# Patient Record
Sex: FEMALE | Race: WHITE | NOT HISPANIC OR LATINO | Employment: OTHER | URBAN - NONMETROPOLITAN AREA
[De-identification: names, ages, dates, MRNs, and addresses within clinical notes are randomized per-mention and may not be internally consistent; named-entity substitution may affect disease eponyms.]

---

## 2017-02-20 RX ORDER — MELOXICAM 15 MG/1
TABLET ORAL
Qty: 30 TABLET | Refills: 5 | Status: SHIPPED | OUTPATIENT
Start: 2017-02-20 | End: 2017-06-20 | Stop reason: SDUPTHER

## 2017-05-19 RX ORDER — MEMANTINE HYDROCHLORIDE 28 MG/1
CAPSULE, EXTENDED RELEASE ORAL
Qty: 30 CAPSULE | Refills: 5 | Status: SHIPPED | OUTPATIENT
Start: 2017-05-19 | End: 2017-06-20 | Stop reason: ALTCHOICE

## 2017-06-20 ENCOUNTER — OFFICE VISIT (OUTPATIENT)
Dept: NEUROLOGY | Facility: CLINIC | Age: 72
End: 2017-06-20

## 2017-06-20 VITALS
WEIGHT: 110 LBS | DIASTOLIC BLOOD PRESSURE: 80 MMHG | BODY MASS INDEX: 20.24 KG/M2 | HEIGHT: 62 IN | SYSTOLIC BLOOD PRESSURE: 128 MMHG | HEART RATE: 64 BPM

## 2017-06-20 DIAGNOSIS — R56.9 GENERALIZED CONVULSIVE SEIZURES (HCC): ICD-10-CM

## 2017-06-20 DIAGNOSIS — F01.50 VASCULAR DEMENTIA WITHOUT BEHAVIORAL DISTURBANCE (HCC): Primary | ICD-10-CM

## 2017-06-20 PROCEDURE — 99213 OFFICE O/P EST LOW 20 MIN: CPT | Performed by: PHYSICIAN ASSISTANT

## 2017-06-20 RX ORDER — MELOXICAM 15 MG/1
15 TABLET ORAL DAILY
Qty: 90 TABLET | Refills: 3 | Status: SHIPPED | OUTPATIENT
Start: 2017-06-20 | End: 2018-06-23 | Stop reason: SDUPTHER

## 2017-06-22 RX ORDER — MEMANTINE HYDROCHLORIDE 28 MG/1
28 CAPSULE, EXTENDED RELEASE ORAL DAILY
Qty: 45 CAPSULE | Refills: 5 | Status: SHIPPED | OUTPATIENT
Start: 2017-06-22 | End: 2018-03-20 | Stop reason: SDUPTHER

## 2017-06-22 NOTE — TELEPHONE ENCOUNTER
Since starting the Namzaric Bita has been nauseated. She would like to stop the Namzaric and go back to the Namenda XR.

## 2017-06-25 NOTE — PROGRESS NOTES
Subjective   Bita Blake is a 72 y.o. female is here today for follow-up.    HPI Comments: No seizure activity.  Household and activities of daily living are well tolerated at this point.    Dementia   This is a chronic problem. The current episode started more than 1 year ago. The problem occurs daily. The problem has been unchanged. Associated symptoms include fatigue and headaches. Pertinent negatives include no chills, congestion, fever, sore throat or weakness. The symptoms are aggravated by stress. Treatments tried: Medical therapy. The treatment provided mild relief.   Seizures    This is a recurrent problem. Episode onset: Proximal in 1 year ago. The problem has been resolved. There were 2 to 3 seizures. The most recent episode lasted 30 to 120 seconds. Associated symptoms include sleepiness, confusion and headaches. Pertinent negatives include no speech difficulty, no visual disturbance and no sore throat. Characteristics include eye blinking, eye deviation and loss of consciousness. The episode was witnessed. There was no sensation of an aura present.       The following portions of the patient's history were reviewed and updated as appropriate: allergies, current medications, past family history, past medical history, past social history, past surgical history and problem list.    Review of Systems   Constitutional: Positive for fatigue. Negative for chills and fever.   HENT: Negative for congestion, ear pain and sore throat.    Eyes: Negative.  Negative for visual disturbance.   Respiratory: Negative.    Cardiovascular: Negative.    Gastrointestinal: Negative.    Endocrine: Negative.    Genitourinary: Negative.    Musculoskeletal: Negative.    Skin: Negative.    Allergic/Immunologic: Negative.    Neurological: Positive for loss of consciousness and headaches. Negative for seizures, facial asymmetry, speech difficulty and weakness.   Hematological: Negative.    Psychiatric/Behavioral: Positive for  agitation, confusion, decreased concentration and dysphoric mood. Negative for behavioral problems, hallucinations and sleep disturbance. The patient is nervous/anxious.        Objective   Physical Exam   Constitutional: Vital signs are normal. She appears well-developed and well-nourished. No distress.   HENT:   Head: Normocephalic and atraumatic.   Right Ear: Hearing, tympanic membrane, external ear and ear canal normal.   Left Ear: Hearing, tympanic membrane, external ear and ear canal normal.   Nose: Nose normal.   Mouth/Throat: Uvula is midline, oropharynx is clear and moist and mucous membranes are normal.   Eyes: Conjunctivae, EOM and lids are normal. Pupils are equal, round, and reactive to light. No scleral icterus.   Neck: Normal range of motion and phonation normal. No tracheal tenderness, no spinous process tenderness and no muscular tenderness present. Carotid bruit is not present. Normal range of motion present. No thyroid mass and no thyromegaly present.   Cardiovascular: Normal rate, regular rhythm, S1 normal, S2 normal and normal heart sounds.    No murmur heard.  Pulmonary/Chest: Effort normal and breath sounds normal. No stridor. No respiratory distress. She has no decreased breath sounds. She has no wheezes. She has no rhonchi. She has no rales.   Musculoskeletal: Normal range of motion. She exhibits no edema or tenderness.        Lumbar back: Normal.   Lymphadenopathy:     She has no cervical adenopathy.   Neurological: She is alert. She has normal strength and normal reflexes. She displays no atrophy and no tremor. No cranial nerve deficit or sensory deficit. She exhibits normal muscle tone. She displays a negative Romberg sign. Coordination and gait normal. GCS eye subscore is 4. GCS verbal subscore is 4. GCS motor subscore is 6.   Reflex Scores:       Tricep reflexes are 2+ on the right side and 2+ on the left side.       Bicep reflexes are 2+ on the right side and 2+ on the left side.        Brachioradialis reflexes are 2+ on the right side and 2+ on the left side.       Patellar reflexes are 2+ on the right side and 2+ on the left side.       Achilles reflexes are 2+ on the right side and 2+ on the left side.  Skin: Skin is warm, dry and intact. No ecchymosis, no lesion and no rash noted. No cyanosis. Nails show no clubbing.   Psychiatric: Her mood appears anxious. Her speech is delayed and tangential. She is slowed. She is not actively hallucinating. Thought content is paranoid and delusional. Cognition and memory are impaired. She expresses impulsivity. She exhibits a depressed mood. She is inattentive.   Nursing note and vitals reviewed.        Assessment/Plan   Bita was seen today for dementia.    Diagnoses and all orders for this visit:    Vascular dementia without behavioral disturbance  -     Discontinue: Memantine HCl-Donepezil HCl (NAMZARIC) 28-10 MG capsule sustained-release 24 hr; Take 1 capsule by mouth Daily.    Generalized convulsive seizures    Other orders  -     meloxicam (MOBIC) 15 MG tablet; Take 1 tablet by mouth Daily.    She will continue on namzaric 28-10 milligrams daily.  We have refilled her mobility today.  Overall recommended no changes in her medication regimen as she is stable at this point.    10 minutes of a 15 minute outpatient visit was spent in counseling and coordination of care with the patient and her family.        EMR Dragon transcription disclaimer:  Much of this encounter note is an electronic transcription/translation of spoken language to printed text.  The electronic translation of spoken language may permit erroneous, or at times, nonsensical words or phrases to be inadvertently transcribed.  The author has reviewed the note for such errors, however some may still exist.

## 2017-09-21 ENCOUNTER — OFFICE VISIT (OUTPATIENT)
Dept: NEUROLOGY | Facility: CLINIC | Age: 72
End: 2017-09-21

## 2017-09-21 VITALS
DIASTOLIC BLOOD PRESSURE: 80 MMHG | HEART RATE: 70 BPM | SYSTOLIC BLOOD PRESSURE: 142 MMHG | BODY MASS INDEX: 20.24 KG/M2 | WEIGHT: 110 LBS | OXYGEN SATURATION: 99 % | HEIGHT: 62 IN

## 2017-09-21 DIAGNOSIS — F01.50 VASCULAR DEMENTIA WITHOUT BEHAVIORAL DISTURBANCE (HCC): Primary | ICD-10-CM

## 2017-09-21 DIAGNOSIS — R56.9 GENERALIZED CONVULSIVE SEIZURES (HCC): ICD-10-CM

## 2017-09-21 PROCEDURE — 99213 OFFICE O/P EST LOW 20 MIN: CPT | Performed by: PHYSICIAN ASSISTANT

## 2017-09-21 RX ORDER — DONEPEZIL HYDROCHLORIDE 5 MG/1
5 TABLET, FILM COATED ORAL NIGHTLY
Qty: 30 TABLET | Refills: 3 | Status: SHIPPED | OUTPATIENT
Start: 2017-09-21 | End: 2018-02-12 | Stop reason: SDUPTHER

## 2017-09-21 NOTE — PROGRESS NOTES
Subjective   Bita Blake is a 72 y.o. female is here today for follow-up.    HPI Comments: No seizure activity.  Household and activities of daily living are well tolerated at this point.    Seizures    This is a recurrent problem. Episode onset: Proximal in 1 year ago. The problem has been resolved. There were 2 to 3 seizures. The most recent episode lasted 30 to 120 seconds. Associated symptoms include sleepiness, confusion and headaches. Pertinent negatives include no speech difficulty and no sore throat. Characteristics include eye blinking, eye deviation and loss of consciousness. The episode was witnessed. There was no sensation of an aura present.   Dementia   This is a chronic problem. The current episode started more than 1 year ago. The problem occurs daily. The problem has been unchanged. Associated symptoms include fatigue and headaches. Pertinent negatives include no chills, congestion, fever, sore throat or weakness. The symptoms are aggravated by stress. Treatments tried: Medical therapy. The treatment provided mild relief.       The following portions of the patient's history were reviewed and updated as appropriate: allergies, current medications, past family history, past medical history, past social history, past surgical history and problem list.    Review of Systems   Constitutional: Positive for fatigue. Negative for chills and fever.   HENT: Negative for congestion, ear pain and sore throat.    Eyes: Negative.    Respiratory: Negative.    Cardiovascular: Negative.    Gastrointestinal: Negative.    Endocrine: Negative.    Genitourinary: Negative.    Musculoskeletal: Negative.    Skin: Negative.    Allergic/Immunologic: Negative.    Neurological: Positive for loss of consciousness and headaches. Negative for seizures, facial asymmetry, speech difficulty and weakness.   Hematological: Negative.    Psychiatric/Behavioral: Positive for agitation, confusion, decreased concentration and dysphoric  mood. Negative for behavioral problems, hallucinations and sleep disturbance. The patient is nervous/anxious.        Objective   Physical Exam   Constitutional: Vital signs are normal. She appears well-developed and well-nourished. No distress.   HENT:   Head: Normocephalic and atraumatic.   Right Ear: Hearing, tympanic membrane, external ear and ear canal normal.   Left Ear: Hearing, tympanic membrane, external ear and ear canal normal.   Nose: Nose normal.   Mouth/Throat: Uvula is midline, oropharynx is clear and moist and mucous membranes are normal.   Eyes: Conjunctivae, EOM and lids are normal. Pupils are equal, round, and reactive to light. No scleral icterus.   Neck: Normal range of motion and phonation normal. No tracheal tenderness, no spinous process tenderness and no muscular tenderness present. Carotid bruit is not present. Normal range of motion present. No thyroid mass and no thyromegaly present.   Cardiovascular: Normal rate, regular rhythm, S1 normal, S2 normal and normal heart sounds.    No murmur heard.  Pulmonary/Chest: Effort normal and breath sounds normal. No stridor. No respiratory distress. She has no decreased breath sounds. She has no wheezes. She has no rhonchi. She has no rales.   Musculoskeletal: Normal range of motion. She exhibits no edema or tenderness.        Lumbar back: Normal.   Lymphadenopathy:     She has no cervical adenopathy.   Neurological: She is alert. She has normal strength and normal reflexes. She displays no atrophy and no tremor. No cranial nerve deficit or sensory deficit. She exhibits normal muscle tone. She displays a negative Romberg sign. Coordination and gait normal. GCS eye subscore is 4. GCS verbal subscore is 4. GCS motor subscore is 6.   Reflex Scores:       Tricep reflexes are 2+ on the right side and 2+ on the left side.       Bicep reflexes are 2+ on the right side and 2+ on the left side.       Brachioradialis reflexes are 2+ on the right side and 2+ on  the left side.       Patellar reflexes are 2+ on the right side and 2+ on the left side.       Achilles reflexes are 2+ on the right side and 2+ on the left side.  Skin: Skin is warm, dry and intact. No ecchymosis, no lesion and no rash noted. No cyanosis. Nails show no clubbing.   Psychiatric: Her mood appears anxious. Her speech is delayed and tangential. She is slowed. She is not actively hallucinating. Thought content is paranoid and delusional. Cognition and memory are impaired. She expresses impulsivity. She exhibits a depressed mood. She is inattentive.   Nursing note and vitals reviewed.        Assessment/Plan   Bita was seen today for seizures and dementia.    Diagnoses and all orders for this visit:    Vascular dementia without behavioral disturbance  -     donepezil (ARICEPT) 5 MG tablet; Take 1 tablet by mouth Every Night.    Generalized convulsive seizures    The patient was unable to tolerate donepezil 10 mg due to GI side effects.  The family is interested in agreeable to trying Aricept 5 mg daily.  No other changes were made to her medication regimen today.    The patient and family continue to wish to avoid antiepileptic therapy she has had no further seizures.    10 minutes of 15 minute outpatient visit was spent in counseling and coordination of care today.        EMR Dragon transcription disclaimer:  Much of this encounter note is an electronic transcription/translation of spoken language to printed text.  The electronic translation of spoken language may permit erroneous, or at times, nonsensical words or phrases to be inadvertently transcribed.  The author has reviewed the note for such errors, however some may still exist.

## 2017-12-24 ENCOUNTER — HOSPITAL ENCOUNTER (EMERGENCY)
Facility: HOSPITAL | Age: 72
Discharge: HOME | End: 2017-12-24
Attending: EMERGENCY MEDICINE | Admitting: EMERGENCY MEDICINE

## 2017-12-24 VITALS
DIASTOLIC BLOOD PRESSURE: 77 MMHG | HEART RATE: 96 BPM | BODY MASS INDEX: 20.24 KG/M2 | WEIGHT: 110 LBS | RESPIRATION RATE: 18 BRPM | HEIGHT: 62 IN | OXYGEN SATURATION: 99 % | TEMPERATURE: 98.6 F | SYSTOLIC BLOOD PRESSURE: 147 MMHG

## 2017-12-24 DIAGNOSIS — R05.9 COUGH: Primary | ICD-10-CM

## 2017-12-24 LAB
FLUAV AG NPH QL: NEGATIVE
FLUBV AG NPH QL IA: NEGATIVE

## 2017-12-24 PROCEDURE — 87804 INFLUENZA ASSAY W/OPTIC: CPT | Performed by: EMERGENCY MEDICINE

## 2017-12-24 PROCEDURE — 99283 EMERGENCY DEPT VISIT LOW MDM: CPT

## 2017-12-24 RX ORDER — FEXOFENADINE HCL AND PSEUDOEPHEDRINE HCI 180; 240 MG/1; MG/1
1 TABLET, EXTENDED RELEASE ORAL DAILY
Qty: 30 TABLET | Refills: 0 | Status: SHIPPED | OUTPATIENT
Start: 2017-12-24 | End: 2019-03-18

## 2017-12-24 RX ORDER — FLUTICASONE PROPIONATE 50 MCG
2 SPRAY, SUSPENSION (ML) NASAL DAILY
Qty: 1 BOTTLE | Refills: 0 | Status: SHIPPED | OUTPATIENT
Start: 2017-12-24 | End: 2018-03-20

## 2018-01-03 NOTE — ED NOTES
"ED Call Back Questions    1. How are you doing since leaving the Emergency Department?    Doing a little better  2. Do you have any questions about your discharge instructions? No     3. Have you filled your new prescriptions yet? Yes   a. Do you have any questions about those medications? No     4. Were you able to make a follow-up appointment with the physician? No     5. Do you have a primary care physician? Yes   a. If No, would you like for me to set you up with one? No   i. If Yes, “I will have our ED  give you a call right back at this number to work with you on the best time for an appointment.”    6. We are always looking to get better at what we do. Do you have any suggestions for what we can do to be even better? N/A  a. If Yes, \"Thank you for sharing your concerns. I apologize. I will follow up with our manager and patient . Would you like someone to call you back?\" No     7. Is there anything else I can do for you? No   Visit was good     Jeevan Buckley  01/03/18 [id number]    "

## 2018-02-12 DIAGNOSIS — F01.50 VASCULAR DEMENTIA WITHOUT BEHAVIORAL DISTURBANCE (HCC): ICD-10-CM

## 2018-02-12 RX ORDER — DONEPEZIL HYDROCHLORIDE 5 MG/1
TABLET, FILM COATED ORAL
Qty: 30 TABLET | Refills: 2 | Status: SHIPPED | OUTPATIENT
Start: 2018-02-12 | End: 2018-03-20 | Stop reason: SDUPTHER

## 2018-03-13 ENCOUNTER — TRANSCRIBE ORDERS (OUTPATIENT)
Dept: ADMINISTRATIVE | Facility: HOSPITAL | Age: 73
End: 2018-03-13

## 2018-03-13 DIAGNOSIS — R13.11 ORAL PHASE DYSPHAGIA: Primary | ICD-10-CM

## 2018-03-15 ENCOUNTER — HOSPITAL ENCOUNTER (OUTPATIENT)
Dept: GENERAL RADIOLOGY | Facility: HOSPITAL | Age: 73
Discharge: HOME | End: 2018-03-15
Admitting: NURSE PRACTITIONER

## 2018-03-15 DIAGNOSIS — R13.12 OROPHARYNGEAL DYSPHAGIA: Primary | ICD-10-CM

## 2018-03-15 DIAGNOSIS — R13.11 ORAL PHASE DYSPHAGIA: ICD-10-CM

## 2018-03-15 PROCEDURE — 74230 X-RAY XM SWLNG FUNCJ C+: CPT

## 2018-03-15 PROCEDURE — 63710000001 BARIUM SULFATE 40 % PASTE: Performed by: NURSE PRACTITIONER

## 2018-03-15 PROCEDURE — 63710000001 BARIUM SULFATE 40 % SUSPENSION: Performed by: NURSE PRACTITIONER

## 2018-03-15 PROCEDURE — A9270 NON-COVERED ITEM OR SERVICE: HCPCS | Performed by: NURSE PRACTITIONER

## 2018-03-15 PROCEDURE — 63710000001 BARIUM SULFATE 40 % RECONSTITUTED SUSPENSION: Performed by: NURSE PRACTITIONER

## 2018-03-15 PROCEDURE — 92611 MOTION FLUOROSCOPY/SWALLOW: CPT

## 2018-03-15 PROCEDURE — G8998 SWALLOW D/C STATUS: HCPCS

## 2018-03-15 PROCEDURE — G8997 SWALLOW GOAL STATUS: HCPCS

## 2018-03-15 PROCEDURE — G8996 SWALLOW CURRENT STATUS: HCPCS

## 2018-03-15 RX ADMIN — BARIUM SULFATE 20 ML: 400 SUSPENSION ORAL at 09:15

## 2018-03-15 RX ADMIN — BARIUM SULFATE 20 ML: 400 PASTE ORAL at 09:15

## 2018-03-15 RX ADMIN — BARIUM SULFATE 20 ML: 0.81 POWDER, FOR SUSPENSION ORAL at 09:15

## 2018-03-15 NOTE — MBS/VFSS/FEES
Speech Language Pathology   [facility] [place]  [facility]       Patient Name: Bita Blake  : 1945  MRN: [id number]    Today's Date: 3/15/2018      Visit Date: 03/15/2018     SPEECH-LANGUAGE PATHOLOGY EVALUTION - VFSS  Subjective: The patient was seen on this date for a VFSS(Videofluoroscopic Swallowing Study).  Patient was alert and cooperative.    Significant history: Dementia, dysphagia     Objective: Risks/benefits were reviewed with the patient, and consent was obtained. The study was completed with SLP and Radiologist present. The patient was seen in lateral view(s). Textures given included thin liquid, nectar thick liquid, honey thick liquid, puree consistency, mechanical soft consistency and regular consistency.    Assessment: The pt was presented the above consistencies in the following order: honey thick, nectar thick (straw), thin (straw), pudding thick, mechanical soft, solid, repeat thin trial, and a barium tablet (utilizing thin liquid water via cup). Pt was noted to have mildly decreased bolus formation, as well as occasional difficulty with lingual anterior to posterior movement of the bolus, resulting in lingual pumping. Decreased base of tongue strength observed on multiple instances, resulting in premature loss as significant as spillage to the pyriform sinuses with nectar thick and thin. Delay in swallow initiation also noted with all presented trials. Moderately decreased laryngeal elevation with weak anterior hyolaryngeal excursion of the hyoid. Initially no epiglottic inversion, though inversion did improve to functional with mechanical soft and regular solid, likely secondary to the increased density of the bolus. Pt was noted to have laryngeal penetration following the initial thin liquid trial of residue undercoating the epiglottis, which was silent and did fall to the level of the vocal folds, without definite aspiration, which remained in the vestibule for the  remainder of the study. Trace laryngeal penetration noted on second thin trial of study. No definite of aspiration was observed throughout the eval. Post swallow residue was felt to be mild-moderate throughout the oropharyngeal area inconsistently, which the pt often clear to mild with a spontaneous multiple swallow.     SLP Findings: Patient presents with mild oropharyngeal dysphagia.     Comments: The results of this study were discussed in full with the pt and pt's spouse. At this time, it is uncertain as to the level of information retention in the patient secondary to observed cognitive impairment, appearing to have delayed processing time. Both parties were educated on her increased risk for aspiration with thin liquid at this time, though given the fact that the pt has no known hx of pneumonia and her complaints at this time were not of significant concern with toleration of thin liquids, that the pt can continue with thin liquids at this time knowing the risk, though the option for thickened liquids was presented at this time. Pt's  also feels best option is to continue with thin liquids at this time, to continue to allow for optimal quality of life through PO intake, given that the pt has had no significant respiratory concerns in association with liquid intake. They were also educated on the Radiologists' concern with the esophageal region, with recommendation for GI consult at this time.      Recommendations: Diet Textures: thin liquid, mechanical soft consistency food. Medications should be taken whole with puree.     Recommended Strategies: Upright for 30 minutes following PO intake, Upright for PO and small bites and sips. Oral care before breakfast, after all meals and PRN.    Other Recommended Evaluations: Referral to GI for esophageal concerns. See Radiologists' report for details.    Dysphagia therapy is not recommended at this time, as pt's swallow is felt to be functional, though should be  "monitored closely by pt's spouse to r/o change or new concerns. Rationale: See above. MD to order repeat VFSS if change or new concerns. Thanks!   Bernadette Balderas, CCC-SLP 3/15/2018 11:35 AM    Visit Dx:     ICD-10-CM ICD-9-CM   1. Oropharyngeal dysphagia R13.12 787.22   2. Oral phase dysphagia R13.11 787.21       Patient Active Problem List   Diagnosis   • Vascular dementia without behavioral disturbance   • Generalized convulsive seizures        Past Medical History:   Diagnosis Date   • Dementia    • Seizures         Past Surgical History:   Procedure Laterality Date   • CHOLECYSTECTOMY     • HYSTERECTOMY                         SLP Adult Swallow Evaluation - 03/15/18 0907        Rehab Evaluation    Document Type evaluation  -TM    Subjective Information no complaints  -TM    Patient Observations alert;cooperative  -TM    Patient/Family Observations Pt was noted to have decreased cognitive fx,  reported hx of dementia for four years.  -TM    Patient Effort good  -TM       General Information    Patient Profile Reviewed yes  -TM    Pertinent History Of Current Problem Pt c/o difficulty swallowing pills and solids, stating that she continually feels as if stated swallowed consistencies are \"sticking\" and will not go down, which persists even hours after PO intake. Reported no prior hx of pneumonia, no other significant past medical hx identified.   -TM    Current Method of Nutrition regular textures;thin liquids  -TM    Precautions/Limitations, Vision WFL with corrective lenses  -TM    Precautions/Limitations, Hearing WFL;other (see comments)   Hearing deficit, or solely secondary to delayed cognition   -TM    Prior Level of Function-Communication cognitive-linguistic impairment  -TM    Prior Level of Function-Swallowing no diet consistency restrictions  -TM    Plans/Goals Discussed with patient;spouse/S.O.;agreed upon  -TM    Barriers to Rehab cognitive status   in patient   -TM    Patient's Goals for " Discharge --   Eating and drinking without difficulty   -TM    Family Goals for Discharge other (see comments)   Safety with PO intake   -TM       Oral Motor and Function    Dentition Assessment natural, present and adequate  -TM    Secretion Management WNL/WFL  -TM    Mucosal Quality moist, healthy  -TM    Gag Response WFL  -TM    Volitional Swallow WFL  -TM    Volitional Cough WFL  -TM       Oral Musculature and Cranial Nerve Assessment    Oral Motor General Assessment generalized oral motor weakness  -TM    Oral Labial or Buccal Impairment, Detail, Cranial Nerve VII (Facial): CN7: Motor Impairment;reduced strength bilaterally  -TM    Lingual Impairment, Detail. Cranial Nerves IX, XII (Glossopharyngeal and Hypoglossal) CN12: Motor Impairment;reduced strength  -TM    Oral Motor, Comment Pt was noted to have some difficulty following commands for oral motor tasks, as she completed labial alternations initially when asked to move tongue from side to side.   -TM       General Eating/Swallowing Observations    Respiratory Support Currently in Use room air  -TM    Eating/Swallowing Skills other (see comments)   Fed per Radiology tech  -TM    Positioning During Eating upright 90 degree;other (see comments)   While standing   -TM       Respiratory    Respiratory Status WFL;room air  -TM       MBS/VFSS    Utensils Used spoon;straw  -TM    Consistencies Trialed regular textures;soft textures;pureed;thin liquids;nectar/syrup-thick liquids;honey-thick liquids  -TM       MBS/VFSS Interpretation    Oral Prep Phase impaired oral phase of swallowing  -TM    Oral Transit Phase impaired  -TM    Oral Residue WFL  -TM    Oral Phase, Comment Decreased bolus formation, difficulty with lingual A-P movement with lingual pumping on occasion, decreased base of tongue strength resulting in premature loss into the pharynx.  -TM       Oral Preparatory Phase    Oral Preparatory Phase prolonged manipulation;inadequate manipulation  -TM     Prolonged Manipulation regular textures  -TM    Inadequate Manipulation mechanical soft  -TM       Oral Transit Phase    Impaired Oral Transit Phase increased A-P transit time;tongue pumping;piecemeal oral transit;premature spillage of liquids into pharynx  -TM    Increased A-P Transit Time mechanical soft  -TM    Tongue Pumping mechanical soft  -TM    Piecemeal Oral Transit pudding/puree;mechanical soft  -TM    Premature Spillage of Liquids into Pharynx nectar-thick liquids;thin liquids;pudding/puree  -TM       Initiation of Pharyngeal Swallow    Initiation of Pharyngeal Swallow bolus in valleculae;bolus in pyriform sinuses  -TM    Pharyngeal Phase impaired pharyngeal phase of swallowing  -TM    Penetration After the Swallow thin liquids;secondary to residue;other (see comments)   Undercoating the epiglottis   -TM    Response to Penetration deep;no response  -TM    Pharyngeal Residue base of tongue;valleculae;pyriform sinuses;laryngeal vestibule  -TM    Response to Residue cleared residue with spontaneous subsequent swallow;other (see comments)   Able to clear from mod to mild   -TM       Esophageal Phase    Esophageal Phase esophageal retention;see radiology report for further details  -TM    Esophageal Phase, Comment Radiologist verbalized recommendation for GI consult following assessement, scope vs CT to be determined by GI  -TM       Clinical Impression    SLP Swallowing Diagnosis mild-moderate  -TM    Functional Impact risk of aspiration/pneumonia  -TM    Criteria for Skilled Therapeutic Interventions Met baseline status  -TM       Recommendations    Therapy Frequency (SLP) evaluation only  -TM    SLP Diet Recommendation soft textures;thin liquids  -TM    Recommended Precautions and Strategies upright posture during/after eating;small bites of food and sips of liquid  -TM    SLP Rec. for Method of Medication Administration meds whole;with pudding or applesauce  -TM    Monitor for Signs of Aspiration  yes;cough;gurgly voice;throat clearing;pneumonia  -TM    Anticipated Dischage Disposition home  -TM    Demonstrates Need for Referral to Another Service gastroenterology  -TM      User Key  (r) = Recorded By, (t) = Taken By, (c) = Cosigned By    Initials Name Provider Type    TM Bernadette Balderas CCC-SLP Speech and Language Pathologist                               OP SLP Education     Row Name 03/15/18 1104       Education    Barriers to Learning Decreased comprehension  -TM    Action Taken to Address Barriers Results also reviewed with pt's spouse   -TM    Education Provided Described results of evaluation;Family/caregivers expressed understanding of evaluation;Patient expressed understanding of evaluation   Reinforcements needed for pt  -TM    Assessed Learning needs;Learning motivation;Learning preferences;Learning readiness  -TM    Learning Motivation Moderate  -TM    Learning Method Explanation  -TM    Teaching Response Reinforcement needed  -TM    Education Comments The results of this study were discussed in full with the pt and pt's spouse. At this time, it is uncertain as to the level of information retention in the patient secondary to observed cognitive impairment, appearing to have delayed processing time. Both parties were educated on her increased risk for aspiration with thin liquid at this time, though given the fact that the pt has no known hx of pneumonia and her complaints at this time were not of significant concern with toleration of thin liquids, that the pt can continue with thin liquids at this time knowing the risk, though the option for thickened liquids was presented at this time. Pt's  also feels best option is to continue with thin liquids at this time, to continue to allow for optimal quality of life through PO intake, given that the pt has had no significant respiratory concerns in association with liquid intake. They were also educated on the Radiologists' concern with the  esophageal region, with recommendation for GI consult at this time.   -TM      User Key  (r) = Recorded By, (t) = Taken By, (c) = Cosigned By    Initials Name Effective Dates    TM AVANI Bernal 08/02/16 -                       SLP Outcome Measures (last 72 hours)      SLP Outcome Measures     Row Name 03/15/18 1114             SLP Outcome Measures    Outcome Measure Used? Adult NOMS  -TM         FCM Scores    FCM Chosen Swallowing  -TM      Swallowing FCM Score 5  -TM        User Key  (r) = Recorded By, (t) = Taken By, (c) = Cosigned By    Initials Name Effective Dates     AVANI Bernal 08/02/16 -                          Time Calculation:        Therapy Charges for Today     Code Description Service Date Service Provider Modifiers Qty    44464779465 HC ST SWALLOWING CURRENT STATUS 3/15/2018 AVANI Bernal GN, CJ 1    02412968998 HC ST SWALLOWING PROJECTED 3/15/2018 AVANI Bernal GN, CJ 1    [id number] HC ST SWALLOWING DISCHARGE 3/15/2018 AVANI Bernal GN,  1    42032227733 HC ST MOTION FLUORO EVAL SWALLOW 8 3/15/2018 AVANI Bernal GN 1                  AVANI Lawson  3/15/2018

## 2018-03-20 ENCOUNTER — TRANSCRIBE ORDERS (OUTPATIENT)
Dept: SPEECH THERAPY | Facility: HOSPITAL | Age: 73
End: 2018-03-20

## 2018-03-20 ENCOUNTER — OFFICE VISIT (OUTPATIENT)
Dept: NEUROLOGY | Facility: CLINIC | Age: 73
End: 2018-03-20

## 2018-03-20 VITALS
WEIGHT: 104 LBS | OXYGEN SATURATION: 99 % | HEART RATE: 75 BPM | SYSTOLIC BLOOD PRESSURE: 128 MMHG | DIASTOLIC BLOOD PRESSURE: 68 MMHG | HEIGHT: 62 IN | BODY MASS INDEX: 19.14 KG/M2

## 2018-03-20 DIAGNOSIS — R56.9 GENERALIZED CONVULSIVE SEIZURES (HCC): ICD-10-CM

## 2018-03-20 DIAGNOSIS — F01.50 VASCULAR DEMENTIA WITHOUT BEHAVIORAL DISTURBANCE (HCC): Primary | ICD-10-CM

## 2018-03-20 DIAGNOSIS — R13.10 DYSPHAGIA, UNSPECIFIED TYPE: Primary | ICD-10-CM

## 2018-03-20 PROCEDURE — 99213 OFFICE O/P EST LOW 20 MIN: CPT | Performed by: PHYSICIAN ASSISTANT

## 2018-03-20 RX ORDER — MEMANTINE HYDROCHLORIDE 28 MG/1
28 CAPSULE, EXTENDED RELEASE ORAL DAILY
Qty: 90 CAPSULE | Refills: 3 | Status: SHIPPED | OUTPATIENT
Start: 2018-03-20 | End: 2019-03-20 | Stop reason: SDUPTHER

## 2018-03-20 RX ORDER — DONEPEZIL HYDROCHLORIDE 5 MG/1
5 TABLET, FILM COATED ORAL
Qty: 90 TABLET | Refills: 3 | Status: SHIPPED | OUTPATIENT
Start: 2018-03-20 | End: 2019-06-19 | Stop reason: SDUPTHER

## 2018-03-20 NOTE — PROGRESS NOTES
Subjective   Bita Blake is a 73 y.o. female is here today for follow-up.    No seizure activity.  Household and activities of daily living are well tolerated at this point.      Seizures    This is a recurrent problem. Episode onset: Proximal in 1 year ago. The problem has been resolved. There were 2 to 3 seizures. The most recent episode lasted 30 to 120 seconds. Associated symptoms include sleepiness, confusion and headaches. Pertinent negatives include no speech difficulty and no sore throat. Characteristics include eye blinking, eye deviation and loss of consciousness. The episode was witnessed. There was no sensation of an aura present.   Dementia   This is a chronic problem. The current episode started more than 1 year ago. The problem occurs daily. The problem has been unchanged. Associated symptoms include headaches. Pertinent negatives include no chills, congestion, fatigue, fever, sore throat or weakness. The symptoms are aggravated by stress. Treatments tried: Medical therapy. The treatment provided mild relief.       The following portions of the patient's history were reviewed and updated as appropriate: allergies, current medications, past family history, past medical history, past social history, past surgical history and problem list.    Review of Systems   Constitutional: Positive for unexpected weight change. Negative for chills, fatigue and fever.   HENT: Negative for congestion, nosebleeds, sore throat and trouble swallowing.    Eyes: Negative.    Respiratory: Negative.    Cardiovascular: Negative.    Gastrointestinal: Negative.    Endocrine: Negative.    Genitourinary: Negative.    Musculoskeletal: Negative.    Skin: Negative.    Allergic/Immunologic: Negative.    Neurological: Positive for loss of consciousness and headaches. Negative for seizures, facial asymmetry, speech difficulty and weakness.   Hematological: Negative.    Psychiatric/Behavioral: Positive for agitation, confusion,  decreased concentration and dysphoric mood. Negative for behavioral problems, hallucinations and sleep disturbance. The patient is nervous/anxious.        Objective   Physical Exam   Constitutional: Vital signs are normal. She appears well-developed and well-nourished. No distress.   HENT:   Head: Normocephalic and atraumatic.   Right Ear: Hearing, tympanic membrane, external ear and ear canal normal.   Left Ear: Hearing, tympanic membrane, external ear and ear canal normal.   Nose: Nose normal.   Mouth/Throat: Uvula is midline, oropharynx is clear and moist and mucous membranes are normal.   Eyes: Conjunctivae, EOM and lids are normal. Pupils are equal, round, and reactive to light. No scleral icterus.   Neck: Normal range of motion and phonation normal. No tracheal tenderness, no spinous process tenderness and no muscular tenderness present. Carotid bruit is not present. Normal range of motion present. No thyroid mass and no thyromegaly present.   Cardiovascular: Normal rate, regular rhythm, S1 normal, S2 normal and normal heart sounds.    No murmur heard.  Pulmonary/Chest: Effort normal and breath sounds normal. No stridor. No respiratory distress. She has no decreased breath sounds. She has no wheezes. She has no rhonchi. She has no rales.   Musculoskeletal: Normal range of motion. She exhibits no edema or tenderness.        Lumbar back: Normal.   Lymphadenopathy:     She has no cervical adenopathy.   Neurological: She is alert. She has normal strength and normal reflexes. She displays no atrophy and no tremor. No cranial nerve deficit or sensory deficit. She exhibits normal muscle tone. She displays a negative Romberg sign. Coordination and gait normal. GCS eye subscore is 4. GCS verbal subscore is 4. GCS motor subscore is 6.   Reflex Scores:       Tricep reflexes are 2+ on the right side and 2+ on the left side.       Bicep reflexes are 2+ on the right side and 2+ on the left side.       Brachioradialis  reflexes are 2+ on the right side and 2+ on the left side.       Patellar reflexes are 2+ on the right side and 2+ on the left side.       Achilles reflexes are 2+ on the right side and 2+ on the left side.  Skin: Skin is warm, dry and intact. No ecchymosis, no lesion and no rash noted. No cyanosis. Nails show no clubbing.   Psychiatric: Her mood appears anxious. Her speech is delayed and tangential. She is slowed. She is not actively hallucinating. Thought content is paranoid and delusional. Cognition and memory are impaired. She expresses impulsivity. She exhibits a depressed mood. She is inattentive.   Nursing note and vitals reviewed.      MMSE score is 15      Assessment/Plan   Bita was seen today for memory loss and seizures.    Diagnoses and all orders for this visit:    Vascular dementia without behavioral disturbance  -     memantine (NAMENDA XR) 28 MG capsule sustained-release 24 hr extended release capsule; Take 1 capsule by mouth Daily.  -     donepezil (ARICEPT) 5 MG tablet; Take 1 tablet by mouth every night at bedtime.    Generalized convulsive seizures    The patient is neurologically stable.  No changes remain her medication regimen today.    10 minutes of 15 minute outpatient visit was spent in counseling and coordination of care with the patient and her .      EMR Dragon transcription disclaimer:  Much of this encounter note is an electronic transcription/translation of spoken language to printed text.  The electronic translation of spoken language may permit erroneous, or at times, nonsensical words or phrases to be inadvertently transcribed.  The author has reviewed the note for such errors, however some may still exist.

## 2018-04-15 NOTE — PROGRESS NOTES
Chief Complaint   Patient presents with   • Difficulty Swallowing     having problems swallowing also time for colon anahi did last one been a long time she stated       Subjective     HPI    Hx of dementia.   Difficulty swallowing daily for several months.  Time would relieve sx as would repeated swallowing.   No heartburn or indigestion.  No abdominal pain.  Sx have improved since video eval in March.  Pt and  deny dietary changes to result in improvement.  No bloody stools, no melena.  Bowels move without difficulty. It has been over 10 yr since last Cscope.    Unsure if polyps removed. (unable to obtain records through care everywhere)    Video swallow eval 3/15/18 - irregular mucosal thickening involving prox esophagus    Last colonoscopy many years ago  No previous EGD    Past Medical History:   Diagnosis Date   • Dementia    • Memory loss    • Seizures        Past Surgical History:   Procedure Laterality Date   • CHOLECYSTECTOMY     • HYSTERECTOMY         Outpatient Prescriptions Marked as Taking for the 4/16/18 encounter (Office Visit) with LISHA Westfall   Medication Sig Dispense Refill   • aspirin 81 MG EC tablet Take 81 mg by mouth daily.     • donepezil (ARICEPT) 5 MG tablet Take 1 tablet by mouth every night at bedtime. 90 tablet 3   • meloxicam (MOBIC) 15 MG tablet Take 1 tablet by mouth Daily. 90 tablet 3       Allergies   Allergen Reactions   • Depakote [Divalproex Sodium] Nausea And Vomiting   • Keppra [Levetiracetam] Nausea And Vomiting   • Seroquel [Quetiapine Fumarate]      Patient can't tolerate higher doses       Social History     Social History   • Marital status:      Spouse name: N/A   • Number of children: N/A   • Years of education: N/A     Occupational History   • Not on file.     Social History Main Topics   • Smoking status: Never Smoker   • Smokeless tobacco: Never Used   • Alcohol use No   • Drug use: No   • Sexual activity: Defer     Other Topics  "Concern   • Not on file     Social History Narrative   • No narrative on file       Family History   Problem Relation Age of Onset   • Early death Mother    • Heart disease Father    • Breast cancer Neg Hx    • Colon cancer Neg Hx    • Esophageal cancer Neg Hx        Review of Systems   Constitutional: Negative for fatigue, fever and unexpected weight change.   HENT: Negative for hearing loss, sore throat and voice change.    Eyes: Negative for visual disturbance.   Respiratory: Negative for cough, shortness of breath and wheezing.    Cardiovascular: Negative for chest pain and palpitations.   Gastrointestinal: Negative for abdominal pain, blood in stool and vomiting.   Endocrine: Negative for polydipsia and polyuria.   Genitourinary: Negative for difficulty urinating, dysuria, hematuria and urgency.   Musculoskeletal: Negative for joint swelling and myalgias.   Skin: Negative for color change, rash and wound.   Neurological: Negative for dizziness, tremors, seizures and syncope.   Hematological: Does not bruise/bleed easily.   Psychiatric/Behavioral: Negative for agitation and confusion. The patient is not nervous/anxious.        Objective     Vitals:    04/16/18 0956   BP: 144/80   Pulse: 62   Temp: 97 °F (36.1 °C)   SpO2: 98%   Weight: 47.2 kg (104 lb)   Height: 160 cm (63\")     Body mass index is 18.42 kg/m².    Physical Exam   Constitutional: She is oriented to person, place, and time. She appears well-developed and well-nourished. She is cooperative.   HENT:   Head: Normocephalic and atraumatic.   Eyes: Conjunctivae are normal. Pupils are equal, round, and reactive to light. No scleral icterus.   Neck: Normal range of motion. Neck supple. No JVD present. No thyroid mass and no thyromegaly present.   Cardiovascular: Normal rate, regular rhythm and normal heart sounds.  Exam reveals no gallop and no friction rub.    No murmur heard.  Pulmonary/Chest: Effort normal and breath sounds normal. No accessory muscle " usage. No respiratory distress. She has no wheezes. She has no rales.   Abdominal: Soft. Normal appearance and bowel sounds are normal. She exhibits no distension, no ascites and no mass. There is no hepatosplenomegaly. There is no tenderness. There is no rebound and no guarding.   Musculoskeletal: Normal range of motion. She exhibits no edema or tenderness.     Vascular Status -  Her right foot exhibits normal foot vasculature  and no edema. Her left foot exhibits normal foot vasculature  and no edema.  Lymphadenopathy:     She has no cervical adenopathy.   Neurological: She is alert and oriented to person, place, and time. She has normal strength. Gait normal.   Skin: Skin is warm, dry and intact. No rash noted.       Imaging Results (most recent)     None          Body mass index is 18.42 kg/m².    Assessment/Plan     Bita was seen today for difficulty swallowing.    Diagnoses and all orders for this visit:    Dysphagia, unspecified type  -     Case Request; Standing  -     Implement Anesthesia Orders Day of Procedure; Standing  -     Obtain Informed Consent; Standing  -     Case Request    Encounter for screening for malignant neoplasm of colon  -     polyethylene glycol (GoLYTELY) 236 g solution; Take 3,785 mL by mouth 1 (One) Time for 1 dose. Take as directed  -     Case Request; Standing  -     Implement Anesthesia Orders Day of Procedure; Standing  -     Obtain Informed Consent; Standing  -     Case Request    Abnormal finding on radiology exam        COLONOSCOPY WITH ANESTHESIA (N/A)   2. ENDOSCOPY WITH ANESTHESIA      Pt  wishes for pt to proceed with cscope eval.  He stated she would not have any difficulty drinking prep.      Advised pt to stop ASA day prior to procedure and to stop use of NSAIDs, Fish Oil, and MV 5 days prior to procedure.  Tylenol based products are ok to take.  Pt verbalized understanding.      The risk of the endoscopy were discussed in detail.  We discussed the risk of  perforation (one out of 2908-4741, riskier with dilation), bleeding (one out of 500), and the rare risks of infection, adverse reaction to anesthesia, respiratory failure, cardiac failure including MI and adverse reaction to medications, etc.  We discussed consequences that could occur if a risk were to develop such as the need for hospitalization, blood transfusion, surgical intervention, medications, pain and disability and death.  Alternatives include not doing anything, or pursuing an UGI series which only offers a diagnosis with potential less accuracy compared to egd.  The patient verbalizes understanding and agrees to proceed.    All risks, benefits, alternatives, and indications of colonoscopy procedure have been discussed with the patient. Risks to include perforation of the colon requiring possible surgery or colostomy, risk of bleeding from biopsies or removal of colon tissue, possibility of missing a colon polyp or cancer, or adverse drug reaction.  Benefits to include the diagnosis and management of disease of the colon and rectum. Alternatives to include barium enema, radiographic evaluation, lab testing or no intervention. Pt verbalizes understanding and agrees to proceed with procedure.      There are no Patient Instructions on file for this visit.

## 2018-04-16 ENCOUNTER — OFFICE VISIT (OUTPATIENT)
Dept: GASTROENTEROLOGY | Facility: CLINIC | Age: 73
End: 2018-04-16

## 2018-04-16 VITALS
HEART RATE: 62 BPM | WEIGHT: 104 LBS | TEMPERATURE: 97 F | BODY MASS INDEX: 18.43 KG/M2 | SYSTOLIC BLOOD PRESSURE: 144 MMHG | DIASTOLIC BLOOD PRESSURE: 80 MMHG | OXYGEN SATURATION: 98 % | HEIGHT: 63 IN

## 2018-04-16 DIAGNOSIS — R13.10 DYSPHAGIA, UNSPECIFIED TYPE: Primary | ICD-10-CM

## 2018-04-16 DIAGNOSIS — Z12.11 ENCOUNTER FOR SCREENING FOR MALIGNANT NEOPLASM OF COLON: ICD-10-CM

## 2018-04-16 DIAGNOSIS — R93.89 ABNORMAL FINDING ON RADIOLOGY EXAM: ICD-10-CM

## 2018-04-16 PROCEDURE — 99204 OFFICE O/P NEW MOD 45 MIN: CPT | Performed by: NURSE PRACTITIONER

## 2018-05-15 ENCOUNTER — ANESTHESIA (OUTPATIENT)
Dept: GASTROENTEROLOGY | Facility: HOSPITAL | Age: 73
End: 2018-05-15

## 2018-05-15 ENCOUNTER — HOSPITAL ENCOUNTER (OUTPATIENT)
Facility: HOSPITAL | Age: 73
Setting detail: HOSPITAL OUTPATIENT SURGERY
Discharge: HOME | End: 2018-05-15
Attending: INTERNAL MEDICINE | Admitting: INTERNAL MEDICINE

## 2018-05-15 ENCOUNTER — ANESTHESIA EVENT (OUTPATIENT)
Dept: GASTROENTEROLOGY | Facility: HOSPITAL | Age: 73
End: 2018-05-15

## 2018-05-15 VITALS
WEIGHT: 100 LBS | BODY MASS INDEX: 18.4 KG/M2 | OXYGEN SATURATION: 100 % | HEIGHT: 62 IN | DIASTOLIC BLOOD PRESSURE: 57 MMHG | HEART RATE: 57 BPM | RESPIRATION RATE: 15 BRPM | SYSTOLIC BLOOD PRESSURE: 151 MMHG | TEMPERATURE: 98.7 F

## 2018-05-15 DIAGNOSIS — R13.10 DYSPHAGIA, UNSPECIFIED TYPE: ICD-10-CM

## 2018-05-15 PROCEDURE — 25010000002 PROPOFOL 10 MG/ML EMULSION: Performed by: NURSE ANESTHETIST, CERTIFIED REGISTERED

## 2018-05-15 PROCEDURE — 43239 EGD BIOPSY SINGLE/MULTIPLE: CPT | Performed by: INTERNAL MEDICINE

## 2018-05-15 PROCEDURE — 87081 CULTURE SCREEN ONLY: CPT | Performed by: INTERNAL MEDICINE

## 2018-05-15 RX ORDER — SODIUM CHLORIDE 0.9 % (FLUSH) 0.9 %
3 SYRINGE (ML) INJECTION AS NEEDED
Status: DISCONTINUED | OUTPATIENT
Start: 2018-05-15 | End: 2018-05-15 | Stop reason: HOSPADM

## 2018-05-15 RX ORDER — PROPOFOL 10 MG/ML
VIAL (ML) INTRAVENOUS AS NEEDED
Status: DISCONTINUED | OUTPATIENT
Start: 2018-05-15 | End: 2018-05-15 | Stop reason: SURG

## 2018-05-15 RX ORDER — SODIUM CHLORIDE 9 MG/ML
500 INJECTION, SOLUTION INTRAVENOUS CONTINUOUS PRN
Status: DISCONTINUED | OUTPATIENT
Start: 2018-05-15 | End: 2018-05-15 | Stop reason: HOSPADM

## 2018-05-15 RX ORDER — LIDOCAINE HYDROCHLORIDE 20 MG/ML
INJECTION, SOLUTION INFILTRATION; PERINEURAL AS NEEDED
Status: DISCONTINUED | OUTPATIENT
Start: 2018-05-15 | End: 2018-05-15 | Stop reason: SURG

## 2018-05-15 RX ADMIN — LIDOCAINE HYDROCHLORIDE 0.5 ML: 10 INJECTION, SOLUTION EPIDURAL; INFILTRATION; INTRACAUDAL; PERINEURAL at 11:38

## 2018-05-15 RX ADMIN — PROPOFOL 50 MG: 10 INJECTION, EMULSION INTRAVENOUS at 11:58

## 2018-05-15 RX ADMIN — PROPOFOL 50 MG: 10 INJECTION, EMULSION INTRAVENOUS at 11:54

## 2018-05-15 RX ADMIN — LIDOCAINE HYDROCHLORIDE 100 MG: 20 INJECTION, SOLUTION INFILTRATION; PERINEURAL at 11:54

## 2018-05-15 RX ADMIN — PROPOFOL 50 MG: 10 INJECTION, EMULSION INTRAVENOUS at 11:56

## 2018-05-15 RX ADMIN — SODIUM CHLORIDE 500 ML: 9 INJECTION, SOLUTION INTRAVENOUS at 11:37

## 2018-05-15 NOTE — H&P (VIEW-ONLY)
Chief Complaint   Patient presents with   • Difficulty Swallowing     having problems swallowing also time for colon anahi did last one been a long time she stated       Subjective     HPI    Hx of dementia.   Difficulty swallowing daily for several months.  Time would relieve sx as would repeated swallowing.   No heartburn or indigestion.  No abdominal pain.  Sx have improved since video eval in March.  Pt and  deny dietary changes to result in improvement.  No bloody stools, no melena.  Bowels move without difficulty. It has been over 10 yr since last Cscope.    Unsure if polyps removed. (unable to obtain records through care everywhere)    Video swallow eval 3/15/18 - irregular mucosal thickening involving prox esophagus    Last colonoscopy many years ago  No previous EGD    Past Medical History:   Diagnosis Date   • Dementia    • Memory loss    • Seizures        Past Surgical History:   Procedure Laterality Date   • CHOLECYSTECTOMY     • HYSTERECTOMY         Outpatient Prescriptions Marked as Taking for the 4/16/18 encounter (Office Visit) with LISHA Westfall   Medication Sig Dispense Refill   • aspirin 81 MG EC tablet Take 81 mg by mouth daily.     • donepezil (ARICEPT) 5 MG tablet Take 1 tablet by mouth every night at bedtime. 90 tablet 3   • meloxicam (MOBIC) 15 MG tablet Take 1 tablet by mouth Daily. 90 tablet 3       Allergies   Allergen Reactions   • Depakote [Divalproex Sodium] Nausea And Vomiting   • Keppra [Levetiracetam] Nausea And Vomiting   • Seroquel [Quetiapine Fumarate]      Patient can't tolerate higher doses       Social History     Social History   • Marital status:      Spouse name: N/A   • Number of children: N/A   • Years of education: N/A     Occupational History   • Not on file.     Social History Main Topics   • Smoking status: Never Smoker   • Smokeless tobacco: Never Used   • Alcohol use No   • Drug use: No   • Sexual activity: Defer     Other Topics  "Concern   • Not on file     Social History Narrative   • No narrative on file       Family History   Problem Relation Age of Onset   • Early death Mother    • Heart disease Father    • Breast cancer Neg Hx    • Colon cancer Neg Hx    • Esophageal cancer Neg Hx        Review of Systems   Constitutional: Negative for fatigue, fever and unexpected weight change.   HENT: Negative for hearing loss, sore throat and voice change.    Eyes: Negative for visual disturbance.   Respiratory: Negative for cough, shortness of breath and wheezing.    Cardiovascular: Negative for chest pain and palpitations.   Gastrointestinal: Negative for abdominal pain, blood in stool and vomiting.   Endocrine: Negative for polydipsia and polyuria.   Genitourinary: Negative for difficulty urinating, dysuria, hematuria and urgency.   Musculoskeletal: Negative for joint swelling and myalgias.   Skin: Negative for color change, rash and wound.   Neurological: Negative for dizziness, tremors, seizures and syncope.   Hematological: Does not bruise/bleed easily.   Psychiatric/Behavioral: Negative for agitation and confusion. The patient is not nervous/anxious.        Objective     Vitals:    04/16/18 0956   BP: 144/80   Pulse: 62   Temp: 97 °F (36.1 °C)   SpO2: 98%   Weight: 47.2 kg (104 lb)   Height: 160 cm (63\")     Body mass index is 18.42 kg/m².    Physical Exam   Constitutional: She is oriented to person, place, and time. She appears well-developed and well-nourished. She is cooperative.   HENT:   Head: Normocephalic and atraumatic.   Eyes: Conjunctivae are normal. Pupils are equal, round, and reactive to light. No scleral icterus.   Neck: Normal range of motion. Neck supple. No JVD present. No thyroid mass and no thyromegaly present.   Cardiovascular: Normal rate, regular rhythm and normal heart sounds.  Exam reveals no gallop and no friction rub.    No murmur heard.  Pulmonary/Chest: Effort normal and breath sounds normal. No accessory muscle " usage. No respiratory distress. She has no wheezes. She has no rales.   Abdominal: Soft. Normal appearance and bowel sounds are normal. She exhibits no distension, no ascites and no mass. There is no hepatosplenomegaly. There is no tenderness. There is no rebound and no guarding.   Musculoskeletal: Normal range of motion. She exhibits no edema or tenderness.     Vascular Status -  Her right foot exhibits normal foot vasculature  and no edema. Her left foot exhibits normal foot vasculature  and no edema.  Lymphadenopathy:     She has no cervical adenopathy.   Neurological: She is alert and oriented to person, place, and time. She has normal strength. Gait normal.   Skin: Skin is warm, dry and intact. No rash noted.       Imaging Results (most recent)     None          Body mass index is 18.42 kg/m².    Assessment/Plan     Bita was seen today for difficulty swallowing.    Diagnoses and all orders for this visit:    Dysphagia, unspecified type  -     Case Request; Standing  -     Implement Anesthesia Orders Day of Procedure; Standing  -     Obtain Informed Consent; Standing  -     Case Request    Encounter for screening for malignant neoplasm of colon  -     polyethylene glycol (GoLYTELY) 236 g solution; Take 3,785 mL by mouth 1 (One) Time for 1 dose. Take as directed  -     Case Request; Standing  -     Implement Anesthesia Orders Day of Procedure; Standing  -     Obtain Informed Consent; Standing  -     Case Request    Abnormal finding on radiology exam        COLONOSCOPY WITH ANESTHESIA (N/A)   2. ENDOSCOPY WITH ANESTHESIA      Pt  wishes for pt to proceed with cscope eval.  He stated she would not have any difficulty drinking prep.      Advised pt to stop ASA day prior to procedure and to stop use of NSAIDs, Fish Oil, and MV 5 days prior to procedure.  Tylenol based products are ok to take.  Pt verbalized understanding.      The risk of the endoscopy were discussed in detail.  We discussed the risk of  perforation (one out of 6745-6618, riskier with dilation), bleeding (one out of 500), and the rare risks of infection, adverse reaction to anesthesia, respiratory failure, cardiac failure including MI and adverse reaction to medications, etc.  We discussed consequences that could occur if a risk were to develop such as the need for hospitalization, blood transfusion, surgical intervention, medications, pain and disability and death.  Alternatives include not doing anything, or pursuing an UGI series which only offers a diagnosis with potential less accuracy compared to egd.  The patient verbalizes understanding and agrees to proceed.    All risks, benefits, alternatives, and indications of colonoscopy procedure have been discussed with the patient. Risks to include perforation of the colon requiring possible surgery or colostomy, risk of bleeding from biopsies or removal of colon tissue, possibility of missing a colon polyp or cancer, or adverse drug reaction.  Benefits to include the diagnosis and management of disease of the colon and rectum. Alternatives to include barium enema, radiographic evaluation, lab testing or no intervention. Pt verbalizes understanding and agrees to proceed with procedure.      There are no Patient Instructions on file for this visit.

## 2018-05-15 NOTE — ANESTHESIA POSTPROCEDURE EVALUATION
Patient: Bita Blake    Procedure Summary     Date:  05/15/18 Room / Location:  [facility] ENDOSCOPY 5 / [facility]    Anesthesia Start:  1152 Anesthesia Stop:  1203    Procedure:  ESOPHAGOGASTRODUODENOSCOPY WITH ANESTHESIA (N/A Esophagus) Diagnosis:       Dysphagia, unspecified type      (Dysphagia, unspecified type [R13.10])    Surgeon:  Hemal Garcia DO Provider:  Adina Radford CRNA    Anesthesia Type:  general ASA Status:  2          Anesthesia Type: general  Last vitals  BP   133/66 (05/15/18 1220)   Temp   98.7 °F (37.1 °C) (05/15/18 1125)   Pulse   51 (05/15/18 1220)   Resp   14 (05/15/18 1220)     SpO2   100 % (05/15/18 1220)     Post Anesthesia Care and Evaluation    Patient location during evaluation: PHASE II  Patient participation: complete - patient participated  Level of consciousness: awake and alert  Pain management: adequate  Airway patency: patent  Anesthetic complications: No anesthetic complications  PONV Status: none  Cardiovascular status: acceptable  Respiratory status: acceptable  Hydration status: acceptable

## 2018-05-15 NOTE — ANESTHESIA PREPROCEDURE EVALUATION
Anesthesia Evaluation     Patient summary reviewed   no history of anesthetic complications:  NPO Solid Status: > 8 hours             Airway   Mallampati: II  TM distance: >3 FB  Neck ROM: full  Dental      Pulmonary - negative pulmonary ROS   Cardiovascular - negative cardio ROS  Exercise tolerance: good (4-7 METS)        Neuro/Psych- negative ROS  GI/Hepatic/Renal/Endo - negative ROS     Musculoskeletal     Abdominal    Substance History      OB/GYN          Other                        Anesthesia Plan    ASA 2     general     intravenous induction   Anesthetic plan and risks discussed with patient.

## 2018-05-16 LAB — UREASE TISS QL: NEGATIVE

## 2018-05-21 ENCOUNTER — ANESTHESIA (OUTPATIENT)
Dept: GASTROENTEROLOGY | Facility: HOSPITAL | Age: 73
End: 2018-05-21

## 2018-05-21 ENCOUNTER — ANESTHESIA EVENT (OUTPATIENT)
Dept: GASTROENTEROLOGY | Facility: HOSPITAL | Age: 73
End: 2018-05-21

## 2018-05-21 ENCOUNTER — HOSPITAL ENCOUNTER (OUTPATIENT)
Facility: HOSPITAL | Age: 73
Setting detail: HOSPITAL OUTPATIENT SURGERY
Discharge: HOME | End: 2018-05-21
Attending: INTERNAL MEDICINE | Admitting: INTERNAL MEDICINE

## 2018-05-21 VITALS
HEART RATE: 65 BPM | BODY MASS INDEX: 18.58 KG/M2 | DIASTOLIC BLOOD PRESSURE: 80 MMHG | RESPIRATION RATE: 22 BRPM | TEMPERATURE: 97.8 F | WEIGHT: 101 LBS | SYSTOLIC BLOOD PRESSURE: 155 MMHG | HEIGHT: 62 IN | OXYGEN SATURATION: 99 %

## 2018-05-21 DIAGNOSIS — Z12.11 ENCOUNTER FOR SCREENING FOR MALIGNANT NEOPLASM OF COLON: ICD-10-CM

## 2018-05-21 PROCEDURE — 45385 COLONOSCOPY W/LESION REMOVAL: CPT | Performed by: INTERNAL MEDICINE

## 2018-05-21 PROCEDURE — 88305 TISSUE EXAM BY PATHOLOGIST: CPT | Performed by: INTERNAL MEDICINE

## 2018-05-21 PROCEDURE — 25010000002 PROPOFOL 10 MG/ML EMULSION: Performed by: NURSE ANESTHETIST, CERTIFIED REGISTERED

## 2018-05-21 RX ORDER — SODIUM CHLORIDE 0.9 % (FLUSH) 0.9 %
3 SYRINGE (ML) INJECTION AS NEEDED
Status: DISCONTINUED | OUTPATIENT
Start: 2018-05-21 | End: 2018-05-21 | Stop reason: HOSPADM

## 2018-05-21 RX ORDER — LIDOCAINE HYDROCHLORIDE 20 MG/ML
INJECTION, SOLUTION INFILTRATION; PERINEURAL AS NEEDED
Status: DISCONTINUED | OUTPATIENT
Start: 2018-05-21 | End: 2018-05-21 | Stop reason: SURG

## 2018-05-21 RX ORDER — SODIUM CHLORIDE 9 MG/ML
500 INJECTION, SOLUTION INTRAVENOUS CONTINUOUS PRN
Status: DISCONTINUED | OUTPATIENT
Start: 2018-05-21 | End: 2018-05-21 | Stop reason: HOSPADM

## 2018-05-21 RX ORDER — PROPOFOL 10 MG/ML
VIAL (ML) INTRAVENOUS AS NEEDED
Status: DISCONTINUED | OUTPATIENT
Start: 2018-05-21 | End: 2018-05-21 | Stop reason: SURG

## 2018-05-21 RX ADMIN — LIDOCAINE HYDROCHLORIDE 80 MG: 20 INJECTION, SOLUTION INFILTRATION; PERINEURAL at 09:49

## 2018-05-21 RX ADMIN — LIDOCAINE HYDROCHLORIDE 0.5 ML: 10 INJECTION, SOLUTION EPIDURAL; INFILTRATION; INTRACAUDAL; PERINEURAL at 08:57

## 2018-05-21 RX ADMIN — PROPOFOL 200 MG: 10 INJECTION, EMULSION INTRAVENOUS at 09:49

## 2018-05-21 RX ADMIN — SODIUM CHLORIDE 500 ML: 9 INJECTION, SOLUTION INTRAVENOUS at 08:57

## 2018-05-21 NOTE — ANESTHESIA PREPROCEDURE EVALUATION
Anesthesia Evaluation     Patient summary reviewed and Nursing notes reviewed   no history of anesthetic complications:  NPO Solid Status: > 8 hours  NPO Liquid Status: > 2 hours           Airway   Mallampati: I  TM distance: <3 FB  Neck ROM: full  no difficulty expected  Dental - normal exam     Pulmonary - negative pulmonary ROS and normal exam   Cardiovascular - negative cardio ROS and normal exam  Exercise tolerance: good (4-7 METS)        Neuro/Psych  (+) dementia,     GI/Hepatic/Renal/Endo - negative ROS     Musculoskeletal (-) negative ROS    Abdominal  - normal exam    Bowel sounds: normal.   Substance History - negative use     OB/GYN negative ob/gyn ROS         Other                        Anesthesia Plan    ASA 3     general     intravenous induction   Anesthetic plan and risks discussed with patient.

## 2018-05-21 NOTE — ANESTHESIA POSTPROCEDURE EVALUATION
Patient: Bita Blake    Procedure Summary     Date:  05/21/18 Room / Location:  [facility] ENDOSCOPY 5 / [facility]    Anesthesia Start:  0945 Anesthesia Stop:  1001    Procedure:  COLONOSCOPY WITH ANESTHESIA (N/A ) Diagnosis:       Encounter for screening for malignant neoplasm of colon      (Encounter for screening for malignant neoplasm of colon [Z12.11])    Surgeon:  Hemal Garcia DO Provider:  Simba Echols CRNA    Anesthesia Type:  general ASA Status:  3          Anesthesia Type: general  Last vitals  BP   146/56 (05/21/18 1015)   Temp   97.8 °F (36.6 °C) (05/21/18 0836)   Pulse   53 (05/21/18 1015)   Resp   15 (05/21/18 1015)     SpO2   100 % (05/21/18 1015)     Post Anesthesia Care and Evaluation    Patient location during evaluation: PHASE II  Patient participation: complete - patient participated  Level of consciousness: awake  Pain management: adequate  Airway patency: patent  Anesthetic complications: No anesthetic complications  Respiratory status: acceptable  Hydration status: acceptable

## 2018-05-21 NOTE — H&P
"[facility]  Pre Procedure History & Physical    Chief Complaint:   Screening  Subjective     HPI:   Screening    Past Medical History:   Past Medical History:   Diagnosis Date   • Dementia    • Memory loss    • Seizures        Past Surgical History:  Past Surgical History:   Procedure Laterality Date   • CHOLECYSTECTOMY     • ENDOSCOPY N/A 5/15/2018    Procedure: ESOPHAGOGASTRODUODENOSCOPY WITH ANESTHESIA;  Surgeon: Hemal Garcia DO;  Location: [facility] ENDOSCOPY;  Service: Gastroenterology   • HYSTERECTOMY         Family History:  Family History   Problem Relation Age of Onset   • Early death Mother    • Heart disease Father    • Breast cancer Neg Hx    • Colon cancer Neg Hx    • Esophageal cancer Neg Hx        Social History:   reports that she has never smoked. She has never used smokeless tobacco. She reports that she does not drink alcohol or use drugs.    Medications:   Prior to Admission medications    Medication Sig Start Date End Date Taking? Authorizing Provider   donepezil (ARICEPT) 5 MG tablet Take 1 tablet by mouth every night at bedtime. 3/20/18  Yes VALENTE Ordonez   fexofenadine-pseudoephedrine (ALLEGRA-D 24) 180-240 MG per 24 hr tablet Take 1 tablet by mouth Daily. 12/24/17  Yes Leonel Newman MD   meloxicam (MOBIC) 15 MG tablet Take 1 tablet by mouth Daily. 6/20/17  Yes VALENTE Ordonez   memantine (NAMENDA XR) 28 MG capsule sustained-release 24 hr extended release capsule Take 1 capsule by mouth Daily. 3/20/18  Yes VALENTE Ordonez   aspirin 81 MG EC tablet Take 81 mg by mouth daily.    Historical Provider, MD       Allergies:  Depakote [divalproex sodium]; Keppra [levetiracetam]; and Seroquel [quetiapine fumarate]    ROS:    General: Weight stable  Resp: No SOA  Cardiovascular: No CP    Objective     Blood pressure 175/76, pulse 81, temperature 97.8 °F (36.6 °C), temperature source Temporal Artery , resp. rate 18, height 157.5 cm (62\"), " weight 45.8 kg (101 lb), SpO2 99 %, not currently breastfeeding.    Physical Exam   Constitutional: Pt is oriented to person, place, and in no distress.   HENT: Mouth/Throat: Oropharynx is clear.   Cardiovascular: Normal rate, regular rhythm.    Pulmonary/Chest: Effort normal. No respiratory distress. No  wheezes.   Abdominal: Soft. Non-distended.  Skin: Skin is warm and dry.   Psychiatric: Mood, memory, affect and judgment appear normal.     Assessment/Plan     Diagnosis:  Screening    Anticipated Surgical Procedure:  C-scope    The risks, benefits, and alternatives of this procedure have been discussed with the patient or the responsible party- the patient understands and agrees to proceed.

## 2018-05-22 ENCOUNTER — TELEPHONE (OUTPATIENT)
Dept: GASTROENTEROLOGY | Facility: CLINIC | Age: 73
End: 2018-05-22

## 2018-05-22 LAB
CYTO UR: NORMAL
LAB AP CASE REPORT: NORMAL
Lab: NORMAL
PATH REPORT.FINAL DX SPEC: NORMAL
PATH REPORT.GROSS SPEC: NORMAL

## 2018-06-25 RX ORDER — MELOXICAM 15 MG/1
TABLET ORAL
Qty: 90 TABLET | Refills: 1 | Status: SHIPPED | OUTPATIENT
Start: 2018-06-25 | End: 2018-12-20 | Stop reason: SDUPTHER

## 2018-09-18 ENCOUNTER — OFFICE VISIT (OUTPATIENT)
Dept: NEUROLOGY | Facility: CLINIC | Age: 73
End: 2018-09-18

## 2018-09-18 VITALS
DIASTOLIC BLOOD PRESSURE: 82 MMHG | WEIGHT: 103 LBS | HEIGHT: 62 IN | HEART RATE: 69 BPM | BODY MASS INDEX: 18.95 KG/M2 | SYSTOLIC BLOOD PRESSURE: 166 MMHG

## 2018-09-18 DIAGNOSIS — R56.9 GENERALIZED CONVULSIVE SEIZURES (HCC): ICD-10-CM

## 2018-09-18 DIAGNOSIS — F01.50 VASCULAR DEMENTIA WITHOUT BEHAVIORAL DISTURBANCE (HCC): Primary | ICD-10-CM

## 2018-09-18 PROCEDURE — 99213 OFFICE O/P EST LOW 20 MIN: CPT | Performed by: PHYSICIAN ASSISTANT

## 2018-09-18 NOTE — PROGRESS NOTES
Subjective   Bita Blake is a 73 y.o. female is here today for follow-up.    No seizure activity.  Household and activities of daily living are well tolerated with occasional assistance at this point.      Seizures    This is a recurrent problem. Episode onset: Proximal in 1 year ago. The problem has been resolved. There were 2 to 3 seizures. The most recent episode lasted 30 to 120 seconds. Associated symptoms include sleepiness and confusion. Pertinent negatives include no headaches and no speech difficulty. Characteristics include eye blinking, eye deviation and loss of consciousness. The episode was witnessed. There was no sensation of an aura present.   Dementia   This is a chronic problem. The current episode started more than 1 year ago. The problem occurs daily. The problem has been gradually worsening. Associated symptoms include fatigue and a rash. Pertinent negatives include no headaches. The symptoms are aggravated by stress and exertion. She has tried rest (Medical therapy) for the symptoms. The treatment provided mild relief.       The following portions of the patient's history were reviewed and updated as appropriate: allergies, current medications, past family history, past medical history, past social history, past surgical history and problem list.    Review of Systems   Constitutional: Positive for fatigue.   HENT: Negative for trouble swallowing.    Eyes: Negative.    Respiratory: Negative.    Cardiovascular: Negative.    Gastrointestinal: Negative.    Endocrine: Negative.    Genitourinary: Negative.    Musculoskeletal: Negative.    Skin: Positive for rash.   Allergic/Immunologic: Negative.    Neurological: Positive for loss of consciousness. Negative for seizures, facial asymmetry, speech difficulty and headaches.   Hematological: Negative.    Psychiatric/Behavioral: Positive for confusion, decreased concentration, dysphoric mood and hallucinations. Negative for agitation and sleep  disturbance. The patient is nervous/anxious.        Objective   Physical Exam   Constitutional: Vital signs are normal. She appears well-developed and well-nourished. No distress.   HENT:   Head: Normocephalic and atraumatic.   Right Ear: Hearing, tympanic membrane, external ear and ear canal normal.   Left Ear: Hearing, tympanic membrane, external ear and ear canal normal.   Nose: Nose normal.   Mouth/Throat: Uvula is midline, oropharynx is clear and moist and mucous membranes are normal.   Eyes: Pupils are equal, round, and reactive to light. Conjunctivae, EOM and lids are normal. No scleral icterus.   Neck: Normal range of motion and phonation normal. No tracheal tenderness, no spinous process tenderness and no muscular tenderness present. Carotid bruit is not present. Normal range of motion present. No thyroid mass and no thyromegaly present.   Cardiovascular: Normal rate, regular rhythm, S1 normal, S2 normal and normal heart sounds.    No murmur heard.  Pulmonary/Chest: Effort normal and breath sounds normal. No stridor. No respiratory distress. She has no decreased breath sounds. She has no wheezes. She has no rhonchi. She has no rales.   Musculoskeletal: Normal range of motion. She exhibits no edema or tenderness.        Lumbar back: Normal.   Lymphadenopathy:     She has no cervical adenopathy.   Neurological: She is alert. She has normal strength and normal reflexes. She displays no atrophy and no tremor. No cranial nerve deficit or sensory deficit. She exhibits normal muscle tone. She displays a negative Romberg sign. Coordination and gait normal. GCS eye subscore is 4. GCS verbal subscore is 4. GCS motor subscore is 6.   Reflex Scores:       Tricep reflexes are 2+ on the right side and 2+ on the left side.       Bicep reflexes are 2+ on the right side and 2+ on the left side.       Brachioradialis reflexes are 2+ on the right side and 2+ on the left side.       Patellar reflexes are 2+ on the right side  and 2+ on the left side.       Achilles reflexes are 2+ on the right side and 2+ on the left side.  Some trouble following examination instructions   Skin: Skin is warm, dry and intact. Rash noted. No ecchymosis and no lesion noted. No cyanosis. Nails show no clubbing.   Psychiatric: Her affect is blunt. Her speech is delayed and tangential. She is slowed. She is not actively hallucinating. Thought content is paranoid and delusional. Cognition and memory are impaired. She expresses impulsivity. She is inattentive.   Nursing note and vitals reviewed.        Assessment/Plan   Bita was seen today for seizures and dementia.    Diagnoses and all orders for this visit:    Vascular dementia without behavioral disturbance    Generalized convulsive seizures (CMS/HCC)    The patient remains generally stable.  Her medication regimen was reviewed in detail.  No changes are made in her medication today.    The patient remains without signs seizure activity.  She is not on any current antiepileptic medication.    10 minutes of 15 minute outpatient visit spent in counseling and coordination of care with the patient and her  today.    Dictated utilizing Dragon dictation.

## 2018-12-20 RX ORDER — MELOXICAM 15 MG/1
TABLET ORAL
Qty: 90 TABLET | Refills: 1 | Status: SHIPPED | OUTPATIENT
Start: 2018-12-20 | End: 2019-06-17 | Stop reason: SDUPTHER

## 2019-03-18 ENCOUNTER — OFFICE VISIT (OUTPATIENT)
Dept: NEUROLOGY | Facility: CLINIC | Age: 74
End: 2019-03-18

## 2019-03-18 VITALS
DIASTOLIC BLOOD PRESSURE: 88 MMHG | HEIGHT: 62 IN | WEIGHT: 100 LBS | SYSTOLIC BLOOD PRESSURE: 150 MMHG | HEART RATE: 72 BPM | BODY MASS INDEX: 18.4 KG/M2

## 2019-03-18 DIAGNOSIS — G40.209 PARTIAL SYMPTOMATIC EPILEPSY WITH COMPLEX PARTIAL SEIZURES, NOT INTRACTABLE, WITHOUT STATUS EPILEPTICUS (HCC): ICD-10-CM

## 2019-03-18 DIAGNOSIS — F01.50 VASCULAR DEMENTIA WITHOUT BEHAVIORAL DISTURBANCE (HCC): Primary | ICD-10-CM

## 2019-03-18 PROBLEM — F03.90 DEMENTIA (HCC): Status: ACTIVE | Noted: 2019-03-18

## 2019-03-18 PROCEDURE — 99214 OFFICE O/P EST MOD 30 MIN: CPT | Performed by: PHYSICIAN ASSISTANT

## 2019-03-18 NOTE — PROGRESS NOTES
Subjective   Bita Blake is a 74 y.o. female is here today for follow-up.    Recent issues with decline and fluctuating mental status.  Family describes decline as lasting sometimes greater than a day, and often worse after awakening.  She does return to her baseline.      Seizures    This is a recurrent problem. Episode onset: Proximal in 1 year ago. The problem has been resolved. There were 2 to 3 seizures. The most recent episode lasted 30 to 120 seconds. Associated symptoms include sleepiness, confusion, speech difficulty and diarrhea. Pertinent negatives include no headaches. Characteristics include eye blinking, eye deviation and loss of consciousness. The episode was witnessed. There was no sensation of an aura present. The seizure(s) had no focality.   Dementia   This is a chronic problem. The current episode started more than 1 year ago. The problem occurs daily. The problem has been gradually worsening. Associated symptoms include fatigue and a rash. Pertinent negatives include no headaches. The symptoms are aggravated by stress and exertion. She has tried rest (Medical therapy) for the symptoms. The treatment provided mild relief.       The following portions of the patient's history were reviewed and updated as appropriate: allergies, current medications, past family history, past medical history, past social history, past surgical history and problem list.    Review of Systems   Constitutional: Positive for fatigue.   HENT: Positive for postnasal drip and rhinorrhea. Negative for trouble swallowing.    Eyes: Negative.    Respiratory: Negative.    Cardiovascular: Negative.    Gastrointestinal: Positive for diarrhea.   Endocrine: Negative.    Genitourinary: Negative.    Musculoskeletal: Negative.    Skin: Positive for rash.   Allergic/Immunologic: Negative.    Neurological: Positive for seizures, loss of consciousness and speech difficulty. Negative for facial asymmetry and headaches.   Hematological:  Negative.    Psychiatric/Behavioral: Positive for confusion, decreased concentration, dysphoric mood and hallucinations. Negative for agitation and sleep disturbance. The patient is nervous/anxious.        Objective   Physical Exam   Constitutional: Vital signs are normal. She appears well-developed and well-nourished. No distress.   HENT:   Head: Normocephalic and atraumatic.   Right Ear: Hearing, tympanic membrane, external ear and ear canal normal.   Left Ear: Hearing, tympanic membrane, external ear and ear canal normal.   Nose: Nose normal.   Mouth/Throat: Uvula is midline, oropharynx is clear and moist and mucous membranes are normal.   Eyes: Conjunctivae, EOM and lids are normal. Pupils are equal, round, and reactive to light. No scleral icterus.   Neck: Normal range of motion and phonation normal. No tracheal tenderness, no spinous process tenderness and no muscular tenderness present. Carotid bruit is not present. Normal range of motion present. No thyroid mass and no thyromegaly present.   Cardiovascular: Normal rate, regular rhythm, S1 normal, S2 normal and normal heart sounds.   No murmur heard.  Pulmonary/Chest: Effort normal and breath sounds normal. No stridor. No respiratory distress. She has no decreased breath sounds. She has no wheezes. She has no rhonchi. She has no rales.   Musculoskeletal: Normal range of motion. She exhibits no edema or tenderness.        Lumbar back: Normal.   Lymphadenopathy:     She has no cervical adenopathy.   Neurological: She is alert. She has normal strength and normal reflexes. She displays no atrophy and no tremor. No cranial nerve deficit or sensory deficit. She exhibits normal muscle tone. She displays a negative Romberg sign. Coordination and gait normal. GCS eye subscore is 4. GCS verbal subscore is 4. GCS motor subscore is 6.   Reflex Scores:       Tricep reflexes are 2+ on the right side and 2+ on the left side.       Bicep reflexes are 2+ on the right side and  2+ on the left side.       Brachioradialis reflexes are 2+ on the right side and 2+ on the left side.       Patellar reflexes are 2+ on the right side and 2+ on the left side.       Achilles reflexes are 2+ on the right side and 2+ on the left side.  Some trouble following examination instructions   Skin: Skin is warm, dry and intact. Rash noted. No ecchymosis and no lesion noted. No cyanosis. Nails show no clubbing.   Psychiatric: Her affect is blunt. Her speech is delayed and tangential. She is slowed. She is not actively hallucinating. Thought content is paranoid and delusional. Cognition and memory are impaired. She expresses impulsivity. She is inattentive.   Nursing note and vitals reviewed.        Assessment/Plan   Bita was seen today for dementia.    Diagnoses and all orders for this visit:    Vascular dementia without behavioral disturbance  -     MRI Brain Without Contrast; Future  -     EEG; Future    Partial symptomatic epilepsy with complex partial seizures, not intractable, without status epilepticus (CMS/HCC)  -     MRI Brain Without Contrast; Future  -     EEG; Future    The patient and family are agreeable to a follow up MRI and EEG.  I have discussed the possibility that seizure activity could be playing a role in her recent changing mental status.  20 minutes of a 25 minute outpatient visit was spent in counseling and coordination of care with the patient and her family.

## 2019-03-20 DIAGNOSIS — F01.50 VASCULAR DEMENTIA WITHOUT BEHAVIORAL DISTURBANCE (HCC): ICD-10-CM

## 2019-03-20 RX ORDER — DONEPEZIL HYDROCHLORIDE 5 MG/1
TABLET, FILM COATED ORAL
Qty: 90 TABLET | Refills: 2 | OUTPATIENT
Start: 2019-03-20

## 2019-03-20 RX ORDER — MEMANTINE HYDROCHLORIDE 28 MG/1
CAPSULE, EXTENDED RELEASE ORAL
Qty: 90 CAPSULE | Refills: 1 | Status: SHIPPED | OUTPATIENT
Start: 2019-03-20 | End: 2019-09-13 | Stop reason: SDUPTHER

## 2019-04-08 ENCOUNTER — HOSPITAL ENCOUNTER (OUTPATIENT)
Dept: NEUROLOGY | Facility: HOSPITAL | Age: 74
Discharge: HOME | End: 2019-04-08
Admitting: PHYSICIAN ASSISTANT

## 2019-04-08 ENCOUNTER — HOSPITAL ENCOUNTER (OUTPATIENT)
Dept: MRI IMAGING | Facility: HOSPITAL | Age: 74
Discharge: HOME | End: 2019-04-08

## 2019-04-08 DIAGNOSIS — G40.209 PARTIAL SYMPTOMATIC EPILEPSY WITH COMPLEX PARTIAL SEIZURES, NOT INTRACTABLE, WITHOUT STATUS EPILEPTICUS (HCC): ICD-10-CM

## 2019-04-08 DIAGNOSIS — F01.50 VASCULAR DEMENTIA WITHOUT BEHAVIORAL DISTURBANCE (HCC): ICD-10-CM

## 2019-04-08 PROCEDURE — 95816 EEG AWAKE AND DROWSY: CPT

## 2019-04-08 PROCEDURE — 95816 EEG AWAKE AND DROWSY: CPT | Performed by: PSYCHIATRY & NEUROLOGY

## 2019-04-08 PROCEDURE — 70551 MRI BRAIN STEM W/O DYE: CPT

## 2019-06-17 DIAGNOSIS — F01.50 VASCULAR DEMENTIA WITHOUT BEHAVIORAL DISTURBANCE (HCC): ICD-10-CM

## 2019-06-17 RX ORDER — MELOXICAM 15 MG/1
TABLET ORAL
Qty: 90 TABLET | Refills: 1 | Status: SHIPPED | OUTPATIENT
Start: 2019-06-17 | End: 2019-12-12 | Stop reason: SDUPTHER

## 2019-06-17 RX ORDER — DONEPEZIL HYDROCHLORIDE 5 MG/1
TABLET, FILM COATED ORAL
Qty: 90 TABLET | Refills: 2 | OUTPATIENT
Start: 2019-06-17

## 2019-06-19 DIAGNOSIS — F01.50 VASCULAR DEMENTIA WITHOUT BEHAVIORAL DISTURBANCE (HCC): ICD-10-CM

## 2019-06-19 RX ORDER — DONEPEZIL HYDROCHLORIDE 5 MG/1
TABLET, FILM COATED ORAL
Qty: 90 TABLET | Refills: 1 | Status: SHIPPED | OUTPATIENT
Start: 2019-06-19 | End: 2019-12-12 | Stop reason: SDUPTHER

## 2019-07-09 ENCOUNTER — OFFICE VISIT (OUTPATIENT)
Dept: NEUROLOGY | Facility: CLINIC | Age: 74
End: 2019-07-09

## 2019-07-09 VITALS
DIASTOLIC BLOOD PRESSURE: 80 MMHG | WEIGHT: 98 LBS | HEART RATE: 66 BPM | HEIGHT: 62 IN | BODY MASS INDEX: 18.03 KG/M2 | SYSTOLIC BLOOD PRESSURE: 140 MMHG

## 2019-07-09 DIAGNOSIS — G30.9 ALZHEIMER'S DEMENTIA WITHOUT BEHAVIORAL DISTURBANCE, UNSPECIFIED TIMING OF DEMENTIA ONSET: ICD-10-CM

## 2019-07-09 DIAGNOSIS — F02.80 ALZHEIMER'S DEMENTIA WITHOUT BEHAVIORAL DISTURBANCE, UNSPECIFIED TIMING OF DEMENTIA ONSET: ICD-10-CM

## 2019-07-09 DIAGNOSIS — G40.209 PARTIAL SYMPTOMATIC EPILEPSY WITH COMPLEX PARTIAL SEIZURES, NOT INTRACTABLE, WITHOUT STATUS EPILEPTICUS (HCC): Primary | ICD-10-CM

## 2019-07-09 PROCEDURE — 99214 OFFICE O/P EST MOD 30 MIN: CPT | Performed by: PHYSICIAN ASSISTANT

## 2019-07-09 NOTE — PROGRESS NOTES
Subjective   Bita Blake is a 74 y.o. female is here today for follow-up.    The patient is experiencing unfortunately progressive neuropsychologic and psychomotor decline.  No reported seizure activity.  Sleeping well.  Appetite is maintained.  Progressive issues with expressive speech, declining motor coordination and some agitation at times.  Tolerating her medication well.  Recently completed follow-up EEG and MRI of the brain.  These studies are reviewed in detail with her  today.      Seizures    This is a recurrent problem. Episode onset: Proximal in 1 year ago. The problem has been resolved. There were 2 to 3 seizures. The most recent episode lasted 30 to 120 seconds. Associated symptoms include sleepiness, confusion, speech difficulty and diarrhea. Characteristics include eye blinking, eye deviation and loss of consciousness. The episode was witnessed. There was no sensation of an aura present. The seizure(s) had no focality.   Dementia   This is a chronic problem. The current episode started more than 1 year ago. The problem occurs daily. The problem has been gradually worsening. Associated symptoms include fatigue and weakness. The symptoms are aggravated by stress and exertion. She has tried rest (Medical therapy) for the symptoms. The treatment provided no relief.       The following portions of the patient's history were reviewed and updated as appropriate: allergies, current medications, past family history, past medical history, past social history, past surgical history and problem list.    Review of Systems   Constitutional: Positive for fatigue.   HENT: Negative for trouble swallowing.    Eyes: Negative.    Respiratory: Negative.    Cardiovascular: Negative.    Gastrointestinal: Positive for diarrhea.   Endocrine: Negative.    Genitourinary: Negative.    Musculoskeletal: Positive for gait problem.   Skin: Negative.    Allergic/Immunologic: Negative.    Neurological: Positive for loss of  consciousness, speech difficulty and weakness. Negative for seizures.   Hematological: Negative.    Psychiatric/Behavioral: Positive for agitation, confusion and hallucinations. Negative for sleep disturbance. The patient is nervous/anxious.        Objective   Physical Exam   Constitutional: Vital signs are normal. She appears well-developed and well-nourished. No distress.   HENT:   Head: Normocephalic and atraumatic.   Right Ear: Hearing and external ear normal.   Left Ear: Hearing and external ear normal.   Nose: Nose normal.   Mouth/Throat: Uvula is midline, oropharynx is clear and moist and mucous membranes are normal.   Eyes: Conjunctivae, EOM and lids are normal. Pupils are equal, round, and reactive to light. No scleral icterus.   Neck: Trachea normal and normal range of motion. No JVD present. Carotid bruit is not present.   Cardiovascular: Normal rate, regular rhythm and normal heart sounds.   No murmur heard.  Pulmonary/Chest: Effort normal and breath sounds normal.   Musculoskeletal: Normal range of motion. She exhibits no edema or tenderness.        Lumbar back: Normal.   Lymphadenopathy:     She has no cervical adenopathy.   Neurological: She is alert. She has normal reflexes. She displays no atrophy and no tremor. No cranial nerve deficit or sensory deficit. She exhibits normal muscle tone. Coordination and gait abnormal. GCS eye subscore is 4. GCS verbal subscore is 3. GCS motor subscore is 6.   Reflex Scores:       Bicep reflexes are 2+ on the right side and 2+ on the left side.       Brachioradialis reflexes are 2+ on the right side and 2+ on the left side.       Patellar reflexes are 2+ on the right side and 2+ on the left side.  Some trouble following examination instructions, progressive psychomotor decline.   Skin: Skin is warm, dry and intact.   Psychiatric: Her affect is blunt. Her speech is tangential (Nonsensical). She is withdrawn. Cognition and memory are impaired. She expresses  impulsivity.   Substantial neurocognitive decline. She is inattentive.   Nursing note and vitals reviewed.        Assessment/Plan   Bita was seen today for dementia and seizures.    Diagnoses and all orders for this visit:    Partial symptomatic epilepsy with complex partial seizures, not intractable, without status epilepticus (CMS/HCC)    Alzheimer's dementia without behavioral disturbance, unspecified timing of dementia onset    The patient's pattern of progressive neuropsychological and psychomotor decline probably indicates the diagnosis of Alzheimer's.  Her MRI findings show only moderate vascular changes.    In the past, the patient's EEG has been quite concerning for seizure activity, patient was unable to tolerate antiepileptic drugs and she and her family (when she was in a previous, more lucid state) had elected to forego antiepileptic medications.  Her recent EEG has not revealed any evident or obvious epileptic findings and she has not had any obvious seizure-like activity clinically.    Patient's family's concerns and questions are reviewed in detail today, 20 minutes of a 25-minute outpatient visit is spent in counseling and coordination of care today.  Today's findings, recent testing results and images from her MRI study are reviewed with the patient's .  Medication recommendations, expectations and follow-up are addressed.      Dictated utilizing Dragon dictation.

## 2019-07-24 NOTE — PROGRESS NOTES
I have reviewed the notes, assessments, and/or procedures performed by VALENTE Perdomo, I concur with her/his documentation of Bita Blake.  Naeem Tay MD

## 2019-09-13 DIAGNOSIS — F01.50 VASCULAR DEMENTIA WITHOUT BEHAVIORAL DISTURBANCE (HCC): ICD-10-CM

## 2019-09-16 RX ORDER — MEMANTINE HYDROCHLORIDE 28 MG/1
CAPSULE, EXTENDED RELEASE ORAL
Qty: 90 CAPSULE | Refills: 1 | Status: SHIPPED | OUTPATIENT
Start: 2019-09-16 | End: 2020-03-11

## 2019-12-12 DIAGNOSIS — F01.50 VASCULAR DEMENTIA WITHOUT BEHAVIORAL DISTURBANCE (HCC): ICD-10-CM

## 2019-12-12 RX ORDER — MELOXICAM 15 MG/1
TABLET ORAL
Qty: 90 TABLET | Refills: 1 | Status: SHIPPED | OUTPATIENT
Start: 2019-12-12 | End: 2020-06-15

## 2019-12-12 RX ORDER — DONEPEZIL HYDROCHLORIDE 5 MG/1
TABLET, FILM COATED ORAL
Qty: 90 TABLET | Refills: 1 | Status: SHIPPED | OUTPATIENT
Start: 2019-12-12 | End: 2020-06-17

## 2020-03-11 DIAGNOSIS — F01.50 VASCULAR DEMENTIA WITHOUT BEHAVIORAL DISTURBANCE (HCC): ICD-10-CM

## 2020-03-11 RX ORDER — MEMANTINE HYDROCHLORIDE 28 MG/1
CAPSULE, EXTENDED RELEASE ORAL
Qty: 90 CAPSULE | Refills: 0 | Status: SHIPPED | OUTPATIENT
Start: 2020-03-11 | End: 2020-03-20

## 2020-03-19 ENCOUNTER — TELEPHONE (OUTPATIENT)
Dept: NEUROLOGY | Facility: CLINIC | Age: 75
End: 2020-03-19

## 2020-03-19 DIAGNOSIS — G30.9 ALZHEIMER'S DEMENTIA WITHOUT BEHAVIORAL DISTURBANCE, UNSPECIFIED TIMING OF DEMENTIA ONSET: Primary | ICD-10-CM

## 2020-03-19 DIAGNOSIS — F02.80 ALZHEIMER'S DEMENTIA WITHOUT BEHAVIORAL DISTURBANCE, UNSPECIFIED TIMING OF DEMENTIA ONSET: Primary | ICD-10-CM

## 2020-03-19 NOTE — TELEPHONE ENCOUNTER
DAVID  [phone]      MEMANTINE IS VERY EXPENSIVE 28 MG  DO YOU HAVE SAMPLES  OR ANYTHING SIMILAR OVER $400.00

## 2020-03-20 RX ORDER — MEMANTINE HYDROCHLORIDE 10 MG/1
10 TABLET ORAL 2 TIMES DAILY
Qty: 180 TABLET | Refills: 1 | Status: SHIPPED | OUTPATIENT
Start: 2020-03-20 | End: 2020-08-18 | Stop reason: SDUPTHER

## 2020-03-20 NOTE — TELEPHONE ENCOUNTER
Provider: VALENTE REZA    Reason for call: [facility] WITH [employer] CALLING BECAUSE THE MEDICATION THAT THE PT TAKES THE memantine (NAMENDA XR) 28 MG capsule sustained-release 24 hr extended release capsule IS VERY EXPENSIVE, IS THERE ANOTHER BRAND OF THE  MEDICATION THAT SHE CAN TAKE CAN TAKE LIKE 2 TABS OR 2 1/2 TABES OF IT,  IF THE DOCTOR WOULD  BE WILLING TO TRY IF NOT THEN THE PT'S  STATED HE WILL PAY THE $400 IF SHE NEEDED.IF THE DOCTOR IS WILLING TO MAKE THE SWITCH THEN THE DOCTOR NEEDS TO CALL IT INTO THE [facility].    PT ONLY HAS ABOUT A WEEKS WORTH IF MEDICATION LEFT.    PLEASE CALL THE  [phone]

## 2020-05-06 ENCOUNTER — TELEPHONE (OUTPATIENT)
Dept: NEUROLOGY | Facility: CLINIC | Age: 75
End: 2020-05-06

## 2020-05-06 NOTE — TELEPHONE ENCOUNTER
Pt  stated that pt is doing well and would like to wait a couple months out to come into the office. I did explain to him to please reach out if any concerns arise or anything else is needed. He voiced understanding.

## 2020-06-15 DIAGNOSIS — F01.50 VASCULAR DEMENTIA WITHOUT BEHAVIORAL DISTURBANCE (HCC): ICD-10-CM

## 2020-06-15 RX ORDER — MELOXICAM 15 MG/1
TABLET ORAL
Qty: 90 TABLET | Refills: 0 | Status: SHIPPED | OUTPATIENT
Start: 2020-06-15 | End: 2020-08-18 | Stop reason: SDUPTHER

## 2020-06-15 RX ORDER — DONEPEZIL HYDROCHLORIDE 5 MG/1
TABLET, FILM COATED ORAL
Qty: 90 TABLET | Refills: 0 | OUTPATIENT
Start: 2020-06-15

## 2020-06-17 DIAGNOSIS — F01.50 VASCULAR DEMENTIA WITHOUT BEHAVIORAL DISTURBANCE (HCC): ICD-10-CM

## 2020-06-17 RX ORDER — MELOXICAM 15 MG/1
TABLET ORAL
Qty: 90 TABLET | Refills: 0 | OUTPATIENT
Start: 2020-06-17

## 2020-06-17 RX ORDER — DONEPEZIL HYDROCHLORIDE 5 MG/1
TABLET, FILM COATED ORAL
Qty: 90 TABLET | Refills: 0 | Status: SHIPPED | OUTPATIENT
Start: 2020-06-17 | End: 2020-08-18 | Stop reason: SDUPTHER

## 2020-08-18 ENCOUNTER — OFFICE VISIT (OUTPATIENT)
Dept: NEUROLOGY | Facility: CLINIC | Age: 75
End: 2020-08-18

## 2020-08-18 VITALS
DIASTOLIC BLOOD PRESSURE: 70 MMHG | BODY MASS INDEX: 16.89 KG/M2 | WEIGHT: 91.8 LBS | OXYGEN SATURATION: 99 % | HEART RATE: 68 BPM | SYSTOLIC BLOOD PRESSURE: 118 MMHG | HEIGHT: 62 IN

## 2020-08-18 DIAGNOSIS — F02.80 ALZHEIMER'S DEMENTIA WITHOUT BEHAVIORAL DISTURBANCE, UNSPECIFIED TIMING OF DEMENTIA ONSET: Primary | ICD-10-CM

## 2020-08-18 DIAGNOSIS — G40.209 PARTIAL SYMPTOMATIC EPILEPSY WITH COMPLEX PARTIAL SEIZURES, NOT INTRACTABLE, WITHOUT STATUS EPILEPTICUS (HCC): ICD-10-CM

## 2020-08-18 DIAGNOSIS — G30.9 ALZHEIMER'S DEMENTIA WITHOUT BEHAVIORAL DISTURBANCE, UNSPECIFIED TIMING OF DEMENTIA ONSET: Primary | ICD-10-CM

## 2020-08-18 PROCEDURE — 99214 OFFICE O/P EST MOD 30 MIN: CPT | Performed by: PHYSICIAN ASSISTANT

## 2020-08-18 RX ORDER — MEMANTINE HYDROCHLORIDE 10 MG/1
10 TABLET ORAL 2 TIMES DAILY
Qty: 180 TABLET | Refills: 6 | Status: SHIPPED | OUTPATIENT
Start: 2020-08-18 | End: 2021-04-14 | Stop reason: ALTCHOICE

## 2020-08-18 RX ORDER — MELOXICAM 15 MG/1
15 TABLET ORAL DAILY
Qty: 90 TABLET | Refills: 6 | Status: SHIPPED | OUTPATIENT
Start: 2020-08-18 | End: 2021-01-01

## 2020-08-18 RX ORDER — DONEPEZIL HYDROCHLORIDE 5 MG/1
5 TABLET, FILM COATED ORAL
Qty: 90 TABLET | Refills: 6 | Status: SHIPPED | OUTPATIENT
Start: 2020-08-18 | End: 2021-04-14 | Stop reason: SDUPTHER

## 2020-08-30 NOTE — PROGRESS NOTES
Subjective   Bita Blake is a 75 y.o. female is here today for follow-up.    The patient is experiencing unfortunately progressive neuropsychologic and psychomotor decline.  No reported seizure activity.  Sleeping well.  Appetite is maintained.  Progressive issues with expressive speech, declining motor coordination and some agitation at times.  Tolerating her medication well.      Seizures    This is a recurrent problem. Episode onset: Proximal in 1 year ago. The problem has been resolved. There were 2 to 3 seizures. The most recent episode lasted 30 to 120 seconds. Associated symptoms include sleepiness, confusion, speech difficulty and diarrhea. Characteristics include eye blinking, eye deviation and loss of consciousness. The episode was witnessed. There was no sensation of an aura present. The seizure(s) had no focality.   Dementia   This is a chronic problem. The current episode started more than 1 year ago. The problem occurs daily. The problem has been gradually worsening. Associated symptoms include fatigue and weakness. The symptoms are aggravated by stress and exertion. She has tried rest (Medical therapy) for the symptoms. The treatment provided no relief.       The following portions of the patient's history were reviewed and updated as appropriate: allergies, current medications, past family history, past medical history, past social history, past surgical history and problem list.    Review of Systems   Constitutional: Positive for fatigue.   HENT: Negative for trouble swallowing.    Eyes: Negative.    Respiratory: Negative.    Cardiovascular: Negative.    Gastrointestinal: Positive for diarrhea.   Endocrine: Negative.    Genitourinary: Negative.    Musculoskeletal: Positive for gait problem.   Skin: Negative.    Allergic/Immunologic: Negative.    Neurological: Positive for loss of consciousness, speech difficulty and weakness. Negative for seizures.   Hematological: Negative.     Psychiatric/Behavioral: Positive for agitation, confusion and hallucinations. Negative for sleep disturbance. The patient is nervous/anxious.        Objective   Physical Exam   Constitutional: Vital signs are normal.   HENT:   Head: Normocephalic.   Right Ear: Hearing and external ear normal.   Left Ear: Hearing and external ear normal.   Nose: Nose normal.   Mouth/Throat: Uvula is midline, oropharynx is clear and moist and mucous membranes are normal.   Eyes: Pupils are equal, round, and reactive to light. Conjunctivae, EOM and lids are normal. No scleral icterus.   Neck: Trachea normal and normal range of motion. No JVD present. Carotid bruit is not present.   Cardiovascular: Normal rate and normal heart sounds.   Pulmonary/Chest: Effort normal and breath sounds normal.   Musculoskeletal: Normal range of motion. She exhibits no edema or tenderness.        Lumbar back: Normal.   Lymphadenopathy:     She has no cervical adenopathy.   Neurological: She is alert. She has normal reflexes. She displays no atrophy and no tremor. No cranial nerve deficit or sensory deficit. She exhibits normal muscle tone. Coordination and gait abnormal. GCS eye subscore is 4. GCS verbal subscore is 3. GCS motor subscore is 6.   Reflex Scores:       Bicep reflexes are 2+ on the right side and 2+ on the left side.       Brachioradialis reflexes are 2+ on the right side and 2+ on the left side.       Patellar reflexes are 2+ on the right side and 2+ on the left side.  Some trouble following examination instructions, more trouble expressing herself, progressive psychomotor decline.   Skin: Skin is warm, dry and intact.   Psychiatric: Her affect is blunt. Her speech is tangential (Nonsensical). She is withdrawn. Cognition and memory are impaired. She expresses impulsivity.   Substantial neurocognitive decline. She is inattentive.   Nursing note and vitals reviewed.        Assessment/Plan   Bita was seen today for seizures and  dementia.    Diagnoses and all orders for this visit:    Alzheimer's dementia without behavioral disturbance, unspecified timing of dementia onset (CMS/[facility])  -     memantine (NAMENDA) 10 MG tablet; Take 1 tablet by mouth 2 (Two) Times a Day.  -     donepezil (ARICEPT) 5 MG tablet; Take 1 tablet by mouth every night at bedtime.    Partial symptomatic epilepsy with complex partial seizures, not intractable, without status epilepticus (CMS/[facility])    Other orders  -     meloxicam (MOBIC) 15 MG tablet; Take 1 tablet by mouth Daily.    The patient has had progressive decline.  She has reached a point where she needs near continuous observation and care.  This is discussed in detail with the family.  She may continue on Namenda and Aricept.  Prognosis and expectations are reviewed in detail.    Patient did have episodes of seizures and abnormal EEG but would not participate in treatment previously.  She has gone a long period of time without seizures and no changes were made today.    Renew meloxicam for musculoskeletal pain complaints.    20 minutes of a 25-minute outpatient visit was spent in counseling and coordination of care today.    Dictated utilizing Dragon dictation.

## 2020-09-14 DIAGNOSIS — F01.50 VASCULAR DEMENTIA WITHOUT BEHAVIORAL DISTURBANCE (HCC): ICD-10-CM

## 2020-09-14 RX ORDER — MEMANTINE HYDROCHLORIDE 28 MG/1
CAPSULE, EXTENDED RELEASE ORAL
Qty: 90 CAPSULE | Refills: 0 | OUTPATIENT
Start: 2020-09-14

## 2020-12-11 DIAGNOSIS — F01.50 VASCULAR DEMENTIA WITHOUT BEHAVIORAL DISTURBANCE (HCC): ICD-10-CM

## 2020-12-11 RX ORDER — MEMANTINE HYDROCHLORIDE 28 MG/1
CAPSULE, EXTENDED RELEASE ORAL
Qty: 90 CAPSULE | Refills: 0 | OUTPATIENT
Start: 2020-12-11

## 2020-12-14 DIAGNOSIS — F01.50 VASCULAR DEMENTIA WITHOUT BEHAVIORAL DISTURBANCE (HCC): ICD-10-CM

## 2020-12-15 RX ORDER — MEMANTINE HYDROCHLORIDE 28 MG/1
CAPSULE, EXTENDED RELEASE ORAL
Qty: 90 CAPSULE | Refills: 0 | OUTPATIENT
Start: 2020-12-15

## 2020-12-22 NOTE — TELEPHONE ENCOUNTER
PATIENTS  CALLED AND STATED [facility] ONLY HAS ONE PILL LEFT FOR RX NAMENDA. HE STATED IT WAS TOO EXPENSIVE AND WANTED TO SEE ABOUT GETTING SOMETHING ELSE IF POSSIBLE.     PLEASE ADVISE    PH: [phone]  CELL: [phone]

## 2020-12-23 DIAGNOSIS — F02.80 ALZHEIMER'S DEMENTIA WITHOUT BEHAVIORAL DISTURBANCE, UNSPECIFIED TIMING OF DEMENTIA ONSET: ICD-10-CM

## 2020-12-23 DIAGNOSIS — G30.9 ALZHEIMER'S DEMENTIA WITHOUT BEHAVIORAL DISTURBANCE, UNSPECIFIED TIMING OF DEMENTIA ONSET: ICD-10-CM

## 2020-12-23 RX ORDER — MEMANTINE HYDROCHLORIDE 10 MG/1
10 TABLET ORAL 2 TIMES DAILY
Qty: 180 TABLET | Refills: 6 | Status: CANCELLED | OUTPATIENT
Start: 2020-12-23

## 2020-12-23 NOTE — TELEPHONE ENCOUNTER
Zoran notified [facility] prescribed Namenda 10 mg tablets.  They discontinued Namenda XR and are going to fill Namenda 10 mg.  Victor Hugo notified.

## 2021-01-01 ENCOUNTER — TELEPHONE (OUTPATIENT)
Dept: NEUROLOGY | Facility: CLINIC | Age: 76
End: 2021-01-01

## 2021-01-01 ENCOUNTER — OFFICE VISIT (OUTPATIENT)
Dept: NEUROLOGY | Facility: CLINIC | Age: 76
End: 2021-01-01

## 2021-01-01 VITALS
HEIGHT: 62 IN | WEIGHT: 90.6 LBS | BODY MASS INDEX: 16.67 KG/M2 | DIASTOLIC BLOOD PRESSURE: 68 MMHG | OXYGEN SATURATION: 99 % | HEART RATE: 67 BPM | SYSTOLIC BLOOD PRESSURE: 116 MMHG

## 2021-01-01 DIAGNOSIS — F02.80 ALZHEIMER'S DEMENTIA WITHOUT BEHAVIORAL DISTURBANCE, UNSPECIFIED TIMING OF DEMENTIA ONSET: Primary | ICD-10-CM

## 2021-01-01 DIAGNOSIS — F01.50 VASCULAR DEMENTIA WITHOUT BEHAVIORAL DISTURBANCE (HCC): ICD-10-CM

## 2021-01-01 DIAGNOSIS — G30.9 ALZHEIMER'S DEMENTIA WITHOUT BEHAVIORAL DISTURBANCE, UNSPECIFIED TIMING OF DEMENTIA ONSET: ICD-10-CM

## 2021-01-01 DIAGNOSIS — G40.209 PARTIAL SYMPTOMATIC EPILEPSY WITH COMPLEX PARTIAL SEIZURES, NOT INTRACTABLE, WITHOUT STATUS EPILEPTICUS (HCC): ICD-10-CM

## 2021-01-01 DIAGNOSIS — G30.9 ALZHEIMER'S DEMENTIA WITHOUT BEHAVIORAL DISTURBANCE, UNSPECIFIED TIMING OF DEMENTIA ONSET: Primary | ICD-10-CM

## 2021-01-01 DIAGNOSIS — M79.18 MUSCULOSKELETAL PAIN: Primary | ICD-10-CM

## 2021-01-01 DIAGNOSIS — F02.80 ALZHEIMER'S DEMENTIA WITHOUT BEHAVIORAL DISTURBANCE, UNSPECIFIED TIMING OF DEMENTIA ONSET: ICD-10-CM

## 2021-01-01 PROCEDURE — 99214 OFFICE O/P EST MOD 30 MIN: CPT | Performed by: PHYSICIAN ASSISTANT

## 2021-01-01 RX ORDER — MEMANTINE HYDROCHLORIDE 10 MG/1
TABLET ORAL
Qty: 180 TABLET | Refills: 1 | Status: SHIPPED | OUTPATIENT
Start: 2021-01-01 | End: 2022-01-01

## 2021-01-01 RX ORDER — MEMANTINE HYDROCHLORIDE 10 MG/1
10 TABLET ORAL 2 TIMES DAILY
Qty: 180 TABLET | Refills: 1 | Status: SHIPPED | OUTPATIENT
Start: 2021-01-01 | End: 2021-01-01

## 2021-01-01 RX ORDER — MEMANTINE HYDROCHLORIDE 10 MG/1
10 TABLET ORAL DAILY
Qty: 180 TABLET | Refills: 1 | Status: SHIPPED | OUTPATIENT
Start: 2021-01-01 | End: 2021-01-01 | Stop reason: SDUPTHER

## 2021-01-01 RX ORDER — MELOXICAM 15 MG/1
TABLET ORAL
Qty: 90 TABLET | Refills: 6 | Status: SHIPPED | OUTPATIENT
Start: 2021-01-01

## 2021-02-25 ENCOUNTER — IMMUNIZATION (OUTPATIENT)
Age: 76
End: 2021-02-25
Payer: MEDICARE

## 2021-02-25 PROCEDURE — 91300 COVID-19, PFIZER VACCINE 30MCG/0.3ML DOSE: CPT | Performed by: FAMILY MEDICINE

## 2021-02-25 PROCEDURE — 0001A PR IMM ADMN SARSCOV2 30MCG/0.3ML DIL RECON 1ST DOSE: CPT | Performed by: FAMILY MEDICINE

## 2021-03-18 ENCOUNTER — IMMUNIZATION (OUTPATIENT)
Age: 76
End: 2021-03-18
Payer: MEDICARE

## 2021-03-18 PROCEDURE — 0001A PR IMM ADMN SARSCOV2 30MCG/0.3ML DIL RECON 1ST DOSE: CPT | Performed by: FAMILY MEDICINE

## 2021-03-18 PROCEDURE — 91300 COVID-19, PFIZER VACCINE 30MCG/0.3ML DOSE: CPT | Performed by: FAMILY MEDICINE

## 2021-03-30 ENCOUNTER — TELEPHONE (OUTPATIENT)
Dept: NEUROLOGY | Facility: CLINIC | Age: 76
End: 2021-03-30

## 2021-03-30 NOTE — TELEPHONE ENCOUNTER
Provider: WILLA AVALOS   Caller: LEONEL   Relationship to Patient: DAUGHTER  [facility]: [facility]   Phone Number: [phone]  Reason for Call: PT HAVING BATHROOM ISSUES, PT IS WEARING DEPENDS. PTS DAUGHTER IS WANTING TO KNOW IF THERE IS ANY SERVICE THAT PT COULD GET TO HELP WITH CARING FOR HER (EX. SHOWERS) PT STARTED THESE ISSUES IN THE LAST 3-4 MONTHS, THESE ACCIDENTS HAPPEN 3 OR 4 TIMES A WEEK MOSTLY IN THE MORNING.     PT HAS BACK AND NECK PAIN THAT FEELS LIKE BURNING. PT IS GIVEN XTRA STRENGTH TYLENOL TO TRY TO HELP, THEY ALSO RUB PAIN RELIEF CREAM WHICH HELP A LITTLE BUT NOTHING RELIVES THIS.  THIS PAIN HAS BEEN GOING ON FOR SEVERAL MONTHS MAYBE OFF AND ON FOR A YEAR. THIS HAPPENS MOST OF THE DAY, PTS DAUGHTER STATES THAT SOME DAYS SHE COMPLAINS ALL DAY ABOUT THIS.     PTS  HAS STATED TO PTS DAUGHTER THAT MUSCLE RELAXERS MAKE HER CONSTIPATED.     When was the patient last seen: 8/18/2020

## 2021-04-14 DIAGNOSIS — G30.9 ALZHEIMER'S DEMENTIA WITHOUT BEHAVIORAL DISTURBANCE, UNSPECIFIED TIMING OF DEMENTIA ONSET: ICD-10-CM

## 2021-04-14 DIAGNOSIS — F02.80 ALZHEIMER'S DEMENTIA WITHOUT BEHAVIORAL DISTURBANCE, UNSPECIFIED TIMING OF DEMENTIA ONSET: ICD-10-CM

## 2021-04-14 RX ORDER — DONEPEZIL HYDROCHLORIDE 5 MG/1
5 TABLET, FILM COATED ORAL
Qty: 90 TABLET | Refills: 1 | Status: SHIPPED | OUTPATIENT
Start: 2021-04-14 | End: 2021-01-01

## 2021-04-14 RX ORDER — MEMANTINE HYDROCHLORIDE 28 MG/1
28 CAPSULE, EXTENDED RELEASE ORAL DAILY
Qty: 90 CAPSULE | Refills: 1 | Status: SHIPPED | OUTPATIENT
Start: 2021-04-14 | End: 2021-01-01

## 2021-04-14 NOTE — TELEPHONE ENCOUNTER
Victor Hugo said he wants to use [facility].  It is difficult to get Bita to swallow her medications, he would like to have Namenda XR so she can take 1 a day instead of twice a day.  [facility] approved script for Namenda XR.  Victor Hugo notified.

## 2021-06-08 NOTE — PROGRESS NOTES
Subjective   Bita Blake is a 76 y.o. female is here today for follow-up.    The patient is experiencing unfortunately progressive neuropsychologic and psychomotor decline.  No reported seizure activity.  Sleeping well.  Appetite is maintained.  Progressive issues with expressive speech, declining motor coordination and some agitation at times.  Tolerating her medication well.      Alzheimer's Disease  Associated symptoms include abdominal pain, fatigue, neck pain and weakness.   Seizures   This is a recurrent problem. Episode onset: Proximal in 1 year ago. The problem has been resolved. There were 2 to 3 seizures. The most recent episode lasted 30 to 120 seconds. Associated symptoms include sleepiness, confusion, speech difficulty and diarrhea. Characteristics include eye blinking, eye deviation, bladder incontinence and loss of consciousness. The episode was witnessed. There was no sensation of an aura present. The seizure(s) had no focality.   Dementia  This is a chronic problem. The current episode started more than 1 year ago. The problem occurs daily. The problem has been gradually worsening. Associated symptoms include abdominal pain, fatigue, neck pain and weakness. The symptoms are aggravated by stress and exertion. She has tried rest (Medical therapy) for the symptoms. The treatment provided no relief.        The following portions of the patient's history were reviewed and updated as appropriate: allergies, current medications, past family history, past medical history, past social history, past surgical history and problem list.    Review of Systems   Constitutional: Positive for fatigue.   Eyes: Negative.    Respiratory: Negative.    Cardiovascular: Negative.    Gastrointestinal: Positive for abdominal pain, constipation and diarrhea.   Genitourinary: Positive for urinary incontinence.   Musculoskeletal: Positive for neck pain and neck stiffness.   Skin: Negative.    Neurological: Positive for  tremors, loss of consciousness, speech difficulty, weakness, memory problem and confusion. Negative for seizures.   Psychiatric/Behavioral: Positive for agitation and decreased concentration.         Current Outpatient Medications:   •  aspirin 81 MG EC tablet, Take 81 mg by mouth daily., Disp: , Rfl:   •  meloxicam (MOBIC) 15 MG tablet, Take 1 tablet by mouth Daily., Disp: 90 tablet, Rfl: 6  •  memantine (NAMENDA XR) 28 MG capsule sustained-release 24 hr extended release capsule, Take 1 capsule by mouth Daily., Disp: 90 capsule, Rfl: 1     Objective   Physical Exam  Vitals and nursing note reviewed.   HENT:      Head: Normocephalic.      Right Ear: Hearing and external ear normal.      Left Ear: Hearing and external ear normal.   Eyes:      General: Lids are normal. Vision grossly intact. Gaze aligned appropriately. No scleral icterus.     Conjunctiva/sclera: Conjunctivae normal.      Pupils: Pupils are equal, round, and reactive to light.   Neck:      Vascular: No JVD.      Trachea: Trachea normal.   Cardiovascular:      Rate and Rhythm: Normal rate.      Heart sounds: Normal heart sounds.   Pulmonary:      Effort: Pulmonary effort is normal.      Breath sounds: Normal breath sounds.   Skin:     General: Skin is warm and dry.   Neurological:      Mental Status: She is alert. Mental status is at baseline.      GCS: GCS eye subscore is 4. GCS verbal subscore is 3. GCS motor subscore is 5.      Cranial Nerves: Cranial nerves are intact.      Motor: Weakness, tremor, atrophy and abnormal muscle tone present.      Gait: Gait abnormal.   Psychiatric:         Attention and Perception: She is inattentive.         Mood and Affect: Affect is blunt.         Speech: She is noncommunicative.         Behavior: Behavior is slowed and withdrawn.         Cognition and Memory: Cognition is impaired.         Judgment: Judgment is inappropriate.      Comments: Profound impairment.           Assessment/Plan   Diagnoses and all orders  for this visit:    1. Alzheimer's dementia without behavioral disturbance, unspecified timing of dementia onset (CMS/HCC) (Primary)    2. Partial symptomatic epilepsy with complex partial seizures, not intractable, without status epilepticus (CMS/HCC)    Discontinue donepezil.  She is having some GI symptoms which could be related and donepezil is not likely of continued benefit at this point.    Extended conversation with the daughter of the patient.  The patient is not able to spell a normal conversation.  This patient presents in advanced dementia stages.  Supportive care is appropriate, no further medication changes are recommended the role of new therapies including aducanumab are discussed.  There is no role for any other therapy at this point.  Home care options including sitters or potentially extended care are discussed.  The family's questions and concerns are also reviewed in detail.                     Dictated utilizing Dragon dictation.

## 2021-06-18 NOTE — TELEPHONE ENCOUNTER
Caller: Shannon Hyde    Relationship: Emergency Contact    Best call back number: [phone]    What medications are you currently taking:   Current Outpatient Medications on File Prior to Visit   Medication Sig Dispense Refill   • aspirin 81 MG EC tablet Take 81 mg by mouth daily.     • meloxicam (MOBIC) 15 MG tablet Take 1 tablet by mouth Daily. 90 tablet 6   • memantine (NAMENDA XR) 28 MG capsule sustained-release 24 hr extended release capsule Take 1 capsule by mouth Daily. 90 capsule 1     No current facility-administered medications on file prior to visit.        When did you start taking these medications: NA    Which medication are you concerned about: MELOXICAM    Who prescribed you this medication: WILLA AVALOS    What are your concerns: PATIENTS DAUGHTER STATES THAT RX COSTS MORE IF IN CAPSULE FORM AND IS ASKING IF THE RX CAN BE SENT IN TABLET FORM DUE TO LOWER COST. PLEASE ADVISE.     How long have you been taking these medications: NA    How long have you had these concerns: NA

## 2021-06-18 NOTE — TELEPHONE ENCOUNTER
Shannon notified Edrachael requested the capsule because he was having difficulty getting Bita to take her medications.  She said he asked her to call and request the tablet.  She is going to call him and ask him if he wants the capsule or the tablet.

## 2021-06-18 NOTE — TELEPHONE ENCOUNTER
Provider: ROBBIE  Caller: LEONEL LAWSON  Relationship to Patient: DAUGHTER  Pharm[facility]: [facility]  Phone Number: [phone]  Reason for Call: RX CHANGED TO TABLET AND NOT CAPSULE.    THANK YOU.

## 2021-06-30 NOTE — TELEPHONE ENCOUNTER
Caller: Leonel Hyde    Relationship: Emergency Contact    Best call back number: [phone]    What medications are you currently taking:   Current Outpatient Medications on File Prior to Visit   Medication Sig Dispense Refill   • aspirin 81 MG EC tablet Take 81 mg by mouth daily.     • meloxicam (MOBIC) 15 MG tablet Take 1 tablet by mouth Daily. 90 tablet 6   • memantine (NAMENDA) 10 MG tablet Take 1 tablet by mouth Daily. 180 tablet 1     No current facility-administered medications on file prior to visit.        When did you start taking these medications: NA    Which medication are you concerned about: MEMANTINE    Who prescribed you this medication: WILLA AVALOS    What are your concerns: LEONEL STATES THAT INSURANCE SENT LETTER THAT STATES THAT RECURRING SUPPLY IS COVERED FOR 30 OR 90 DAYS BUT NOTHING ABOVE THAT. SHE WAS JUST CONFUSED AS WHY [facility] RECEIVED THAT LETTER. THE LETTER SAID APPROVED FOR SHORTER RECURRING SUPPLY THAN REQUESTED. PLEASE ADVISE. 180 TABLETS IS WHAT IS ON RX AND I LET PATIENT KNOW THAT WAS SUPPLY REQUESTED.. PLEASE ADVISE.     How long have you been taking these medications: NA    How long have you had these concerns: NA

## 2021-06-30 NOTE — TELEPHONE ENCOUNTER
Shannon notified the script was written for 1 tablet daily and the supply was for twice a day.  We will send another script.

## 2022-01-01 ENCOUNTER — OFFICE VISIT (OUTPATIENT)
Dept: NEUROLOGY | Facility: CLINIC | Age: 77
End: 2022-01-01

## 2022-01-01 ENCOUNTER — TELEPHONE (OUTPATIENT)
Dept: NEUROLOGY | Facility: CLINIC | Age: 77
End: 2022-01-01

## 2022-01-01 VITALS
HEIGHT: 62 IN | DIASTOLIC BLOOD PRESSURE: 70 MMHG | WEIGHT: 88.4 LBS | BODY MASS INDEX: 16.27 KG/M2 | OXYGEN SATURATION: 99 % | SYSTOLIC BLOOD PRESSURE: 122 MMHG | HEART RATE: 72 BPM

## 2022-01-01 DIAGNOSIS — F02.80 ALZHEIMER'S DEMENTIA WITHOUT BEHAVIORAL DISTURBANCE, UNSPECIFIED TIMING OF DEMENTIA ONSET: Primary | ICD-10-CM

## 2022-01-01 DIAGNOSIS — G40.209 PARTIAL SYMPTOMATIC EPILEPSY WITH COMPLEX PARTIAL SEIZURES, NOT INTRACTABLE, WITHOUT STATUS EPILEPTICUS: ICD-10-CM

## 2022-01-01 DIAGNOSIS — G30.9 ALZHEIMER'S DEMENTIA WITHOUT BEHAVIORAL DISTURBANCE, UNSPECIFIED TIMING OF DEMENTIA ONSET: Primary | ICD-10-CM

## 2022-01-01 PROCEDURE — 99214 OFFICE O/P EST MOD 30 MIN: CPT | Performed by: PHYSICIAN ASSISTANT

## 2022-01-01 RX ORDER — TRAZODONE HYDROCHLORIDE 50 MG/1
50 TABLET ORAL AS NEEDED
COMMUNITY
Start: 2021-01-01

## 2022-01-12 NOTE — PROGRESS NOTES
Subjective   Bita Blake is a 77 y.o. female is here today for follow-up.    The patient is experiencing unfortunately progressive neuropsychologic and psychomotor decline.  No reported seizure activity.  Not sleeping well.  Appetite is varied.  Progressive issues with expressive speech, declining motor coordination and some agitation at times.  She has experienced fairly frequent falling without injury.  She has had issues with incontinence which has been distressing for the family.  Recently, she has had some issues with complaints of constipation or apparent constipation and sometimes diarrhea without evident abdominal pain or acute abdominal issues that were obvious.         The following portions of the patient's history were reviewed and updated as appropriate: allergies, current medications, past family history, past medical history, past social history, past surgical history and problem list.    Review of Systems   Constitutional: Positive for activity change.   Eyes: Negative.    Respiratory: Negative.    Cardiovascular: Negative.    Gastrointestinal: Positive for constipation and diarrhea.   Genitourinary: Positive for urinary incontinence.   Musculoskeletal: Positive for neck pain and neck stiffness.   Skin: Negative.    Neurological: Positive for tremors, loss of consciousness, speech difficulty, weakness, memory problem and confusion. Negative for seizures.   Psychiatric/Behavioral: Positive for agitation, behavioral problems, decreased concentration and sleep disturbance.         Current Outpatient Medications:   •  meloxicam (MOBIC) 15 MG tablet, TAKE 1 TABLET EVERY DAY (Patient taking differently: Take 15 mg by mouth Daily.), Disp: 90 tablet, Rfl: 6  •  traZODone (DESYREL) 50 MG tablet, Take 50 mg by mouth As Needed for Sleep., Disp: , Rfl:      Objective   Physical Exam  Vitals and nursing note reviewed.   HENT:      Head: Normocephalic.      Right Ear: Hearing and external ear normal.      Left  Ear: Hearing and external ear normal.   Eyes:      General: Lids are normal. Vision grossly intact. Gaze aligned appropriately. No scleral icterus.     Conjunctiva/sclera: Conjunctivae normal.      Pupils: Pupils are equal, round, and reactive to light.   Neck:      Vascular: No JVD.      Trachea: Trachea normal.   Cardiovascular:      Rate and Rhythm: Normal rate.      Heart sounds: Normal heart sounds.   Pulmonary:      Effort: Pulmonary effort is normal.      Breath sounds: Normal breath sounds.   Skin:     General: Skin is warm and dry.   Neurological:      Mental Status: She is alert. Mental status is at baseline.      GCS: GCS eye subscore is 4. GCS verbal subscore is 3. GCS motor subscore is 5.      Cranial Nerves: Cranial nerves are intact.      Motor: Weakness, tremor, atrophy and abnormal muscle tone present.      Gait: Gait abnormal.   Psychiatric:         Attention and Perception: She is inattentive.         Mood and Affect: Affect is blunt.         Speech: She is noncommunicative.         Behavior: Behavior is slowed and withdrawn.         Cognition and Memory: Cognition is impaired.         Judgment: Judgment is inappropriate.      Comments: Profound impairment.           Assessment/Plan   Diagnoses and all orders for this visit:    1. Alzheimer's dementia without behavioral disturbance, unspecified timing of dementia onset (HCC) (Primary)    2. Partial symptomatic epilepsy with complex partial seizures, not intractable, without status epilepticus (HCC)      Discontinue aspirin.  Discontinue memantine.  The family will reevaluate her fluctuating abdominal status in 1 week.  I have talked with him about doing a bowel regimen that may allow for more regular prediction of bowel movements given the patient's issues with incontinence and diminished awareness.    An extended conversation with the possibility of proceeding with evaluation for long-term care placement.    The patient's previously noted  seizures and abnormal EEG have not presented as clinically evidence of seizure activity in over 2 years and antiepileptic medications have been discontinued for quite some time.  She will remain off of antiepileptic medications.    Trazodone has been prescribed at at bedtime but currently is being held.  This can be discussed further if her issues with constipation and diarrhea improved after discontinuance of memantine.    Questions and concerns raised by the family are discussed in detail.  Follow-up is established.               Dictated utilizing Dragon dictation.

## 2022-03-21 NOTE — TELEPHONE ENCOUNTER
Provider:  BENNETT AVALOS     Caller:  LEONEL     Relationship to Patient:  DAUGHTER       Phone Number: [phone]  Reason for Call:  DAUGHTER CALLING IN STATES PT IS MUCH WORSE , SHE IS UNABLE TO WALK ON HER OWN .  UNABLE TO UNDERSTAND ANYTHING PT IS SAYING   CAN NOT TELL IF SHE IS PAIN PT LOOKS AS IF SHE IS IN PAIN  IS NOT EATING WELL HAS TO BE FED  EVEN WHEN FED NOT EATING OR DRINKING MUCH DAUGHTER WAS NOT SURE IF REFERRAL FOR HOSPICE WAS NEEDED WANTED TO CONTACT THEM ASAP BUT WANTED TO CHECK WITH ROBBIE FIRST . PLEASE CONTACT TO DISCUSS   When was the patient last seen: 01/12/2022

## 2022-03-21 NOTE — TELEPHONE ENCOUNTER
PATIENT DAUGHTER TELEPHONED; FOLLOWING UP ON EARLIER MESSAGE; WHAT IS THE STATUS?    PLEASE CALL & ADVISE.    THANK YOU.

## 2022-03-22 NOTE — TELEPHONE ENCOUNTER
----- Message from VALENTE Ordonez sent at 3/21/2022  4:33 PM CDT -----  Maybe Peter Jordan can see if there's anything otherwise going on - I would not mind to see her, but ultimately I think hospice isn't unreasonable an I am willing to request it      ----- Message -----  From: Raissa Melendez LPN  Sent: 3/21/2022  10:39 AM CDT  To: VALENTE Ordonez    Shannon (daughter) said Bita has had a dramatic decline in the last 1 1/2 to 2 weeks.  She is eating and drinking a little.  They can't understand most of what she says, occasionally they understand yes or no.  She is agitated and restless, not sleeping much.  She is pulling at her shoulder and moaning.  She wants to know what you suggest and wants to know if she needs a referral to Hospice.

## 2022-03-22 NOTE — TELEPHONE ENCOUNTER
Shannon said Bita saw Peter Domingueztr today.  They don't think she has a UTI, they were unable to get much urine.  They have her an antibiotic injection and he is referring her to Hospice.

## (undated) DEVICE — ENDOGATOR AUXILIARY WATER JET CONNECTOR: Brand: ENDOGATOR

## (undated) DEVICE — Device: Brand: DEFENDO AIR/WATER/SUCTION AND BIOPSY VALVE

## (undated) DEVICE — CONMED SCOPE SAVER BITE BLOCK, 20X27 MM: Brand: SCOPE SAVER

## (undated) DEVICE — SENSR O2 OXIMAX FNGR A/ 18IN NONSTR

## (undated) DEVICE — FRCP BX RADJAW4 NDL 2.8 240 STD OG

## (undated) DEVICE — THE CHANNEL CLEANING BRUSH IS A NYLON FLEXI BRUSH ATTACHED TO A FLEXIBLE PLASTIC SHEATH DESIGNED TO SAFELY REMOVE DEBRIS FROM FLEXIBLE ENDOSCOPES.

## (undated) DEVICE — TBG SMPL FLTR LINE NASL 02/C02 A/ BX/100

## (undated) DEVICE — MSK O2 MD CONCENTR A/ LF 7FT 1P/U

## (undated) DEVICE — SNAR POLYP CAPTIVATOR MICROHEX 13 240CM

## (undated) DEVICE — CUFF,BP,DISP,1 TUBE,ADULT,HP: Brand: MEDLINE

## (undated) DEVICE — THE SINGLE USE ETRAP – POLYP TRAP IS USED FOR SUCTION RETRIEVAL OF ENDOSCOPICALLY REMOVED POLYPS.: Brand: ETRAP